# Patient Record
Sex: FEMALE | NOT HISPANIC OR LATINO | Employment: UNEMPLOYED | URBAN - METROPOLITAN AREA
[De-identification: names, ages, dates, MRNs, and addresses within clinical notes are randomized per-mention and may not be internally consistent; named-entity substitution may affect disease eponyms.]

---

## 2018-07-15 ENCOUNTER — HOSPITAL ENCOUNTER (INPATIENT)
Facility: HOSPITAL | Age: 18
LOS: 2 days | DRG: 812 | End: 2018-07-17
Attending: EMERGENCY MEDICINE | Admitting: INTERNAL MEDICINE
Payer: COMMERCIAL

## 2018-07-15 DIAGNOSIS — E87.6 HYPOKALEMIA: ICD-10-CM

## 2018-07-15 DIAGNOSIS — T50.902A INTENTIONAL DRUG OVERDOSE, INITIAL ENCOUNTER (HCC): Primary | ICD-10-CM

## 2018-07-15 DIAGNOSIS — T44.3X1A ANTICHOLINERGIC SYNDROME: ICD-10-CM

## 2018-07-15 PROBLEM — T14.91XA SUICIDE ATTEMPT (HCC): Status: ACTIVE | Noted: 2018-07-15

## 2018-07-15 PROBLEM — T50.901A OVERDOSE: Status: ACTIVE | Noted: 2018-07-15

## 2018-07-15 LAB
ALBUMIN SERPL BCP-MCNC: 4.8 G/DL (ref 3.5–5)
ALP SERPL-CCNC: 56 U/L (ref 46–116)
ALT SERPL W P-5'-P-CCNC: 23 U/L (ref 12–78)
AMPHETAMINES SERPL QL SCN: NEGATIVE
ANION GAP SERPL CALCULATED.3IONS-SCNC: 13 MMOL/L (ref 4–13)
ANION GAP SERPL CALCULATED.3IONS-SCNC: 17 MMOL/L (ref 4–13)
APAP SERPL-MCNC: <2 UG/ML (ref 10–30)
APAP SERPL-MCNC: <2 UG/ML (ref 10–30)
APTT PPP: 32 SECONDS (ref 24–36)
AST SERPL W P-5'-P-CCNC: 15 U/L (ref 5–45)
BACTERIA UR QL AUTO: ABNORMAL /HPF
BARBITURATES UR QL: NEGATIVE
BASOPHILS # BLD AUTO: 0.03 THOUSANDS/ΜL (ref 0–0.1)
BASOPHILS NFR BLD AUTO: 0 % (ref 0–1)
BENZODIAZ UR QL: NEGATIVE
BILIRUB SERPL-MCNC: 0.3 MG/DL (ref 0.2–1)
BILIRUB UR QL STRIP: NEGATIVE
BUN SERPL-MCNC: 12 MG/DL (ref 5–25)
BUN SERPL-MCNC: 16 MG/DL (ref 5–25)
CALCIUM SERPL-MCNC: 10 MG/DL (ref 8.3–10.1)
CALCIUM SERPL-MCNC: 8.7 MG/DL (ref 8.3–10.1)
CHLORIDE SERPL-SCNC: 100 MMOL/L (ref 100–108)
CHLORIDE SERPL-SCNC: 106 MMOL/L (ref 100–108)
CK SERPL-CCNC: 52 U/L (ref 26–192)
CLARITY UR: ABNORMAL
CO2 SERPL-SCNC: 22 MMOL/L (ref 21–32)
CO2 SERPL-SCNC: 22 MMOL/L (ref 21–32)
COCAINE UR QL: NEGATIVE
COLOR UR: YELLOW
COLOR, POC: YELLOW
CREAT SERPL-MCNC: 0.93 MG/DL (ref 0.6–1.3)
CREAT SERPL-MCNC: 1.06 MG/DL (ref 0.6–1.3)
EOSINOPHIL # BLD AUTO: 0.17 THOUSAND/ΜL (ref 0–0.61)
EOSINOPHIL NFR BLD AUTO: 1 % (ref 0–6)
ERYTHROCYTE [DISTWIDTH] IN BLOOD BY AUTOMATED COUNT: 13.1 % (ref 11.6–15.1)
ETHANOL SERPL-MCNC: <3 MG/DL (ref 0–3)
EXT PREG TEST URINE: NEGATIVE
GFR SERPL CREATININE-BSD FRML MDRD: 76 ML/MIN/1.73SQ M
GFR SERPL CREATININE-BSD FRML MDRD: 89 ML/MIN/1.73SQ M
GLUCOSE SERPL-MCNC: 132 MG/DL (ref 65–140)
GLUCOSE SERPL-MCNC: 86 MG/DL (ref 65–140)
GLUCOSE UR STRIP-MCNC: NEGATIVE MG/DL
HCT VFR BLD AUTO: 40.4 % (ref 34.8–46.1)
HGB BLD-MCNC: 13.7 G/DL (ref 11.5–15.4)
HGB UR QL STRIP.AUTO: ABNORMAL
INR PPP: 1.18 (ref 0.86–1.17)
KETONES UR STRIP-MCNC: NEGATIVE MG/DL
LEUKOCYTE ESTERASE UR QL STRIP: ABNORMAL
LYMPHOCYTES # BLD AUTO: 4.26 THOUSANDS/ΜL (ref 0.6–4.47)
LYMPHOCYTES NFR BLD AUTO: 36 % (ref 14–44)
MAGNESIUM SERPL-MCNC: 1.8 MG/DL (ref 1.6–2.6)
MCH RBC QN AUTO: 26.7 PG (ref 26.8–34.3)
MCHC RBC AUTO-ENTMCNC: 33.9 G/DL (ref 31.4–37.4)
MCV RBC AUTO: 79 FL (ref 82–98)
METHADONE UR QL: NEGATIVE
MONOCYTES # BLD AUTO: 0.69 THOUSAND/ΜL (ref 0.17–1.22)
MONOCYTES NFR BLD AUTO: 6 % (ref 4–12)
NEUTROPHILS # BLD AUTO: 6.58 THOUSANDS/ΜL (ref 1.85–7.62)
NEUTS SEG NFR BLD AUTO: 56 % (ref 43–75)
NITRITE UR QL STRIP: NEGATIVE
NON-SQ EPI CELLS URNS QL MICRO: ABNORMAL /HPF
NRBC BLD AUTO-RTO: 0 /100 WBCS
OPIATES UR QL SCN: NEGATIVE
PCP UR QL: NEGATIVE
PH UR STRIP.AUTO: 5.5 [PH] (ref 4.5–8)
PLATELET # BLD AUTO: 267 THOUSANDS/UL (ref 149–390)
PMV BLD AUTO: 11 FL (ref 8.9–12.7)
POTASSIUM SERPL-SCNC: 2.6 MMOL/L (ref 3.5–5.3)
POTASSIUM SERPL-SCNC: 4.3 MMOL/L (ref 3.5–5.3)
PROT SERPL-MCNC: 7.9 G/DL (ref 6.4–8.2)
PROT UR STRIP-MCNC: NEGATIVE MG/DL
PROTHROMBIN TIME: 15.1 SECONDS (ref 11.8–14.2)
RBC # BLD AUTO: 5.13 MILLION/UL (ref 3.81–5.12)
RBC #/AREA URNS AUTO: ABNORMAL /HPF
SALICYLATES SERPL-MCNC: <3 MG/DL (ref 3–20)
SODIUM SERPL-SCNC: 139 MMOL/L (ref 136–145)
SODIUM SERPL-SCNC: 141 MMOL/L (ref 136–145)
SP GR UR STRIP.AUTO: <=1.005 (ref 1–1.03)
THC UR QL: NEGATIVE
TSH SERPL DL<=0.05 MIU/L-ACNC: 2.1 UIU/ML (ref 0.46–3.98)
UROBILINOGEN UR QL STRIP.AUTO: 0.2 E.U./DL
WBC # BLD AUTO: 11.73 THOUSAND/UL (ref 4.31–10.16)
WBC #/AREA URNS AUTO: ABNORMAL /HPF

## 2018-07-15 PROCEDURE — 82550 ASSAY OF CK (CPK): CPT | Performed by: EMERGENCY MEDICINE

## 2018-07-15 PROCEDURE — 80307 DRUG TEST PRSMV CHEM ANLYZR: CPT | Performed by: EMERGENCY MEDICINE

## 2018-07-15 PROCEDURE — 99285 EMERGENCY DEPT VISIT HI MDM: CPT

## 2018-07-15 PROCEDURE — C9113 INJ PANTOPRAZOLE SODIUM, VIA: HCPCS | Performed by: INTERNAL MEDICINE

## 2018-07-15 PROCEDURE — 85730 THROMBOPLASTIN TIME PARTIAL: CPT | Performed by: INTERNAL MEDICINE

## 2018-07-15 PROCEDURE — 85610 PROTHROMBIN TIME: CPT | Performed by: INTERNAL MEDICINE

## 2018-07-15 PROCEDURE — 99223 1ST HOSP IP/OBS HIGH 75: CPT | Performed by: INTERNAL MEDICINE

## 2018-07-15 PROCEDURE — 36415 COLL VENOUS BLD VENIPUNCTURE: CPT | Performed by: EMERGENCY MEDICINE

## 2018-07-15 PROCEDURE — 93005 ELECTROCARDIOGRAM TRACING: CPT

## 2018-07-15 PROCEDURE — 81001 URINALYSIS AUTO W/SCOPE: CPT

## 2018-07-15 PROCEDURE — 80053 COMPREHEN METABOLIC PANEL: CPT | Performed by: EMERGENCY MEDICINE

## 2018-07-15 PROCEDURE — 80048 BASIC METABOLIC PNL TOTAL CA: CPT | Performed by: INTERNAL MEDICINE

## 2018-07-15 PROCEDURE — 83735 ASSAY OF MAGNESIUM: CPT | Performed by: EMERGENCY MEDICINE

## 2018-07-15 PROCEDURE — 81002 URINALYSIS NONAUTO W/O SCOPE: CPT | Performed by: EMERGENCY MEDICINE

## 2018-07-15 PROCEDURE — 85025 COMPLETE CBC W/AUTO DIFF WBC: CPT | Performed by: EMERGENCY MEDICINE

## 2018-07-15 PROCEDURE — 80320 DRUG SCREEN QUANTALCOHOLS: CPT | Performed by: EMERGENCY MEDICINE

## 2018-07-15 PROCEDURE — 80329 ANALGESICS NON-OPIOID 1 OR 2: CPT | Performed by: EMERGENCY MEDICINE

## 2018-07-15 PROCEDURE — 80329 ANALGESICS NON-OPIOID 1 OR 2: CPT | Performed by: INTERNAL MEDICINE

## 2018-07-15 PROCEDURE — 96374 THER/PROPH/DIAG INJ IV PUSH: CPT

## 2018-07-15 PROCEDURE — 84443 ASSAY THYROID STIM HORMONE: CPT | Performed by: EMERGENCY MEDICINE

## 2018-07-15 PROCEDURE — 96361 HYDRATE IV INFUSION ADD-ON: CPT

## 2018-07-15 PROCEDURE — 81025 URINE PREGNANCY TEST: CPT | Performed by: EMERGENCY MEDICINE

## 2018-07-15 RX ORDER — POTASSIUM CHLORIDE 14.9 MG/ML
20 INJECTION INTRAVENOUS
Status: DISCONTINUED | OUTPATIENT
Start: 2018-07-15 | End: 2018-07-15

## 2018-07-15 RX ORDER — ONDANSETRON 2 MG/ML
4 INJECTION INTRAMUSCULAR; INTRAVENOUS EVERY 6 HOURS PRN
Status: DISCONTINUED | OUTPATIENT
Start: 2018-07-15 | End: 2018-07-17

## 2018-07-15 RX ORDER — LORAZEPAM 2 MG/ML
1 INJECTION INTRAMUSCULAR EVERY 4 HOURS PRN
Status: DISCONTINUED | OUTPATIENT
Start: 2018-07-15 | End: 2018-07-16

## 2018-07-15 RX ORDER — ACETAMINOPHEN 325 MG/1
650 TABLET ORAL EVERY 6 HOURS PRN
Status: DISCONTINUED | OUTPATIENT
Start: 2018-07-15 | End: 2018-07-17 | Stop reason: HOSPADM

## 2018-07-15 RX ORDER — LORAZEPAM 2 MG/ML
1 INJECTION INTRAMUSCULAR ONCE
Status: COMPLETED | OUTPATIENT
Start: 2018-07-15 | End: 2018-07-15

## 2018-07-15 RX ORDER — SODIUM CHLORIDE AND POTASSIUM CHLORIDE .9; .15 G/100ML; G/100ML
125 SOLUTION INTRAVENOUS CONTINUOUS
Status: DISCONTINUED | OUTPATIENT
Start: 2018-07-15 | End: 2018-07-15

## 2018-07-15 RX ORDER — SODIUM CHLORIDE 9 MG/ML
125 INJECTION, SOLUTION INTRAVENOUS CONTINUOUS
Status: DISCONTINUED | OUTPATIENT
Start: 2018-07-15 | End: 2018-07-15

## 2018-07-15 RX ORDER — SODIUM CHLORIDE 9 MG/ML
100 INJECTION, SOLUTION INTRAVENOUS CONTINUOUS
Status: DISCONTINUED | OUTPATIENT
Start: 2018-07-15 | End: 2018-07-16

## 2018-07-15 RX ORDER — HALOPERIDOL 5 MG/ML
5 INJECTION INTRAMUSCULAR ONCE
Status: COMPLETED | OUTPATIENT
Start: 2018-07-15 | End: 2018-07-15

## 2018-07-15 RX ORDER — PANTOPRAZOLE SODIUM 40 MG/1
40 INJECTION, POWDER, FOR SOLUTION INTRAVENOUS
Status: DISCONTINUED | OUTPATIENT
Start: 2018-07-15 | End: 2018-07-16

## 2018-07-15 RX ADMIN — SODIUM CHLORIDE 1000 ML: 0.9 INJECTION, SOLUTION INTRAVENOUS at 05:14

## 2018-07-15 RX ADMIN — PANTOPRAZOLE SODIUM 40 MG: 40 INJECTION, POWDER, FOR SOLUTION INTRAVENOUS at 17:10

## 2018-07-15 RX ADMIN — ONDANSETRON 4 MG: 2 INJECTION INTRAMUSCULAR; INTRAVENOUS at 07:15

## 2018-07-15 RX ADMIN — SODIUM CHLORIDE 1000 ML: 0.9 INJECTION, SOLUTION INTRAVENOUS at 03:40

## 2018-07-15 RX ADMIN — LORAZEPAM 1 MG: 2 INJECTION INTRAMUSCULAR; INTRAVENOUS at 03:43

## 2018-07-15 RX ADMIN — LORAZEPAM 1 MG: 2 INJECTION INTRAMUSCULAR; INTRAVENOUS at 19:26

## 2018-07-15 RX ADMIN — SODIUM CHLORIDE AND POTASSIUM CHLORIDE 125 ML/HR: .9; .15 SOLUTION INTRAVENOUS at 08:22

## 2018-07-15 RX ADMIN — SODIUM CHLORIDE 100 ML/HR: 0.9 INJECTION, SOLUTION INTRAVENOUS at 17:10

## 2018-07-15 RX ADMIN — POTASSIUM CHLORIDE 20 MEQ: 200 INJECTION, SOLUTION INTRAVENOUS at 05:14

## 2018-07-15 RX ADMIN — HALOPERIDOL LACTATE 5 MG: 5 INJECTION, SOLUTION INTRAMUSCULAR at 19:34

## 2018-07-15 RX ADMIN — LORAZEPAM 1 MG: 2 INJECTION INTRAMUSCULAR; INTRAVENOUS at 15:21

## 2018-07-15 NOTE — ED NOTES
Pt's belongings gone through  No valuable found  No illicit drugs or empty pill bottles found        Dimas Saldaña RN  07/15/18 2042

## 2018-07-15 NOTE — ED NOTES
Per Dr Agustin Samaniego, ok to take pt to inpt bed  Spoke with ICU, states ok for pt to come to assigned bed  1:1 at bedside in ICU        Kirk Pritchett RN  07/15/18 0795

## 2018-07-15 NOTE — ED PROVIDER NOTES
History  Chief Complaint   Patient presents with    Overdose - Intentional     Patient presents with significant other and friend, report patient took "a percocet" and "a whole bottle of ibuprofen" prior to arrival, friend estimates approximately 1 hour ago  Patient shares "rough past", expressing that she took pills intentionally  Patient presents tonight with her significant other and another friend for an overdose  They state that the patient told them that she took a bottle of ibuprofen and a Percocet about an hour ago  She got into a fight with her significant other which prompted the overdose  It was a suicide attempt  Her friend states that she has done this in the past   She has overdosed on Benadryl and tried to hang herself  They adamantly deny any other ingestion  Patient is awake and alert but disoriented  She cannot give me a proper history  History provided by:  Significant other and friend   used: No    Overdose - Intentional   Ingested substance:  OTC medication  Time since incident:  1 hour  Ingestion amount:  Unknown  Witnesses present: no    Called poison control: no    Incident location:  Another residence  Context: suicide attempt    Associated symptoms: altered mental status and slurred speech    Risk factors: hx of self injury, hx of suicide attempts and similar prior episodes        None       History reviewed  No pertinent past medical history  History reviewed  No pertinent surgical history  History reviewed  No pertinent family history  I have reviewed and agree with the history as documented  Social History   Substance Use Topics    Smoking status: Never Smoker    Smokeless tobacco: Never Used    Alcohol use No        Review of Systems   Unable to perform ROS: Mental status change       Physical Exam  Physical Exam   Constitutional: She appears well-developed and well-nourished  HENT:   Head: Normocephalic and atraumatic     Nose: Nose normal    Mouth/Throat: Mucous membranes are dry  Eyes: Conjunctivae and EOM are normal  Pupils are equal, round, and reactive to light  Right eye exhibits no nystagmus  Left eye exhibits no nystagmus  Neck: Normal range of motion  Neck supple  Cardiovascular: Regular rhythm, normal heart sounds and intact distal pulses  Tachycardia present  Exam reveals no friction rub  No murmur heard  Pulmonary/Chest: Effort normal and breath sounds normal  No stridor  No respiratory distress  She has no wheezes  She has no rales  Abdominal: Soft  She exhibits no distension  There is no tenderness  There is no rebound and no guarding  Musculoskeletal: Normal range of motion  She exhibits no edema, tenderness or deformity  Neurological: She is alert  She is disoriented  Reflex Scores:       Patellar reflexes are 1+ on the right side and 1+ on the left side  2-3 beat clonus   Skin: Skin is warm and dry  Psychiatric: Her affect is blunt  Her speech is slurred  She is slowed and withdrawn  She expresses suicidal ideation  She expresses suicidal plans  Nursing note and vitals reviewed        Vital Signs  ED Triage Vitals   Temperature Pulse Respirations Blood Pressure SpO2   07/15/18 0331 07/15/18 0327 07/15/18 0331 07/15/18 0327 07/15/18 0327   98 4 °F (36 9 °C) (!) 167 18 (!) 181/126 100 %      Temp Source Heart Rate Source Patient Position - Orthostatic VS BP Location FiO2 (%)   07/15/18 0331 07/15/18 0327 07/15/18 0327 07/15/18 0327 --   Oral Monitor Sitting Right arm       Pain Score       --                  Vitals:    07/15/18 0327 07/15/18 0435   BP: (!) 181/126 (!) 179/83   Pulse: (!) 167 (!) 146   Patient Position - Orthostatic VS: Sitting Lying       Visual Acuity      ED Medications  Medications   potassium chloride 20 mEq IVPB (premix) (not administered)   sodium chloride 0 9 % bolus 1,000 mL (not administered)   sodium chloride 0 9 % bolus 1,000 mL (1,000 mL Intravenous New Bag 7/15/18 0340) LORazepam (ATIVAN) 2 mg/mL injection 1 mg (1 mg Intravenous Given 7/15/18 0343)       Diagnostic Studies  Results Reviewed     Procedure Component Value Units Date/Time    Rapid drug screen, urine [[id number]]  (Normal) Collected:  07/15/18 0434    Lab Status:  Final result Specimen:  Urine from Urine, Clean Catch Updated:  07/15/18 0500     Amph/Meth UR Negative     Barbiturate Ur Negative     Benzodiazepine Urine Negative     Cocaine Urine Negative     Methadone Urine Negative     Opiate Urine Negative     PCP Ur Negative     THC Urine Negative    Narrative:         FOR MEDICAL PURPOSES ONLY  IF CONFIRMATION NEEDED PLEASE CONTACT THE LAB WITHIN 5 DAYS      Drug Screen Cutoff Levels:  AMPHETAMINE/METHAMPHETAMINES  1000 ng/mL  BARBITURATES     200 ng/mL  BENZODIAZEPINES     200 ng/mL  COCAINE      300 ng/mL  METHADONE      300 ng/mL  OPIATES      300 ng/mL  PHENCYCLIDINE     25 ng/mL  THC       50 ng/mL    Urine Microscopic [[id number]]  (Abnormal) Collected:  07/15/18 0438    Lab Status:  Final result Specimen:  Urine from Urine, Clean Catch Updated:  07/15/18 0452     RBC, UA None Seen /hpf      WBC, UA 2-4 (A) /hpf      Epithelial Cells Innumerable (A) /hpf      Bacteria, UA Occasional /hpf     POCT urinalysis dipstick [[id number]]  (Abnormal) Resulted:  07/15/18 0433    Lab Status:  Final result Specimen:  Urine Updated:  07/15/18 0433     Color, UA yellow    POCT pregnancy, urine [[id number]]  (Normal) Resulted:  07/15/18 0433    Lab Status:  Final result Updated:  07/15/18 0433     EXT PREG TEST UR (Ref: Negative) negative    ED Urine Macroscopic [[id number]]  (Abnormal) Collected:  07/15/18 0438    Lab Status:  Final result Specimen:  Urine Updated:  07/15/18 0432     Color, UA Yellow     Clarity, UA Cloudy     pH, UA 5 5     Leukocytes, UA Trace (A)     Nitrite, UA Negative     Protein, UA Negative mg/dl      Glucose, UA Negative mg/dl      Ketones, UA Negative mg/dl      Urobilinogen, UA 0 2 E U /dl Bilirubin, UA Negative     Blood, UA Trace (A)     Specific [place], UA <=1 005    Narrative:       CLINITEK RESULT    Acetaminophen level [[id number]]  (Abnormal) Collected:  07/15/18 0340    Lab Status:  Final result Specimen:  Blood from Arm, Right Updated:  07/15/18 0422     Acetaminophen Level <2 (L) ug/mL     Comprehensive metabolic panel [[id number]]  (Abnormal) Collected:  07/15/18 0340    Lab Status:  Final result Specimen:  Blood from Arm, Right Updated:  07/15/18 0416     Sodium 139 mmol/L      Potassium 2 6 (LL) mmol/L      Chloride 100 mmol/L      CO2 22 mmol/L      Anion Gap 17 (H) mmol/L      BUN 16 mg/dL      Creatinine 1 06 mg/dL      Glucose 132 mg/dL      Calcium 10 0 mg/dL      AST 15 U/L      ALT 23 U/L      Alkaline Phosphatase 56 U/L      Total Protein 7 9 g/dL      Albumin 4 8 g/dL      Total Bilirubin 0 30 mg/dL      eGFR 76 ml/min/1 73sq m     Narrative:         National Kidney Disease Education Program recommendations are as follows:  GFR calculation is accurate only with a steady state creatinine  Chronic Kidney disease less than 60 ml/min/1 73 sq  meters  Kidney failure less than 15 ml/min/1 73 sq  meters  Ethanol [[id number]]  (Normal) Collected:  07/15/18 0340    Lab Status:  Final result Specimen:  Blood from Arm, Right Updated:  07/15/18 0415     Ethanol Lvl <3 mg/dL     Magnesium [[id number]]  (Normal) Collected:  07/15/18 0340    Lab Status:  Final result Specimen:  Blood from Arm, Right Updated:  07/15/18 0410     Magnesium 1 8 mg/dL     TSH [[id number]]  (Normal) Collected:  07/15/18 0340    Lab Status:  Final result Specimen:  Blood from Arm, Right Updated:  07/15/18 0410     TSH 3RD GENERATON 2 097 uIU/mL     Narrative:         Patients undergoing fluorescein dye angiography may retain small amounts of fluorescein in the body for 48-72 hours post procedure  Samples containing fluorescein can produce falsely depressed TSH values   If the patient had this procedure,a specimen should be resubmitted post fluorescein clearance            The recommended reference ranges for TSH during pregnancy are as follows:  First trimester 0 1 to 2 5 uIU/mL  Second trimester  0 2 to 3 0 uIU/mL  Third trimester 0 3 to 3 0 uIU/m      CK Total with Reflex CKMB [[id number]]  (Normal) Collected:  07/15/18 0340    Lab Status:  Final result Specimen:  Blood from Arm, Right Updated:  07/15/18 0410     Total CK 52 U/L     Salicylate level [[id number]]  (Abnormal) Collected:  07/15/18 0340    Lab Status:  Final result Specimen:  Blood from Arm, Right Updated:  62/88/38 8794     Salicylate Lvl <3 (L) mg/dL     CBC and differential [[id number]]  (Abnormal) Collected:  07/15/18 0340    Lab Status:  Final result Specimen:  Blood from Arm, Right Updated:  07/15/18 0345     WBC 11 73 (H) Thousand/uL      RBC 5 13 (H) Million/uL      Hemoglobin 13 7 g/dL      Hematocrit 40 4 %      MCV 79 (L) fL      MCH 26 7 (L) pg      MCHC 33 9 g/dL      RDW 13 1 %      MPV 11 0 fL      Platelets 722 Thousands/uL      nRBC 0 /100 WBCs      Neutrophils Relative 56 %      Lymphocytes Relative 36 %      Monocytes Relative 6 %      Eosinophils Relative 1 %      Basophils Relative 0 %      Neutrophils Absolute 6 58 Thousands/µL      Lymphocytes Absolute 4 26 Thousands/µL      Monocytes Absolute 0 69 Thousand/µL      Eosinophils Absolute 0 17 Thousand/µL      Basophils Absolute 0 03 Thousands/µL                  No orders to display              Procedures  ECG 12 Lead Documentation  Date/Time: 7/15/2018 3:52 AM  Performed by: Danae Finn  Authorized by: Danae Finn     Indications / Diagnosis:  Overdose  Patient location:  ED  Previous ECG:     Previous ECG:  Unavailable  Interpretation:     Interpretation: normal    Quality:     Tracing quality:  Limited by artifact  Rate:     ECG rate:  161    ECG rate assessment: tachycardic    Rhythm:     Rhythm: sinus tachycardia    Ectopy:     Ectopy: none    QRS:     QRS axis:  Right    QRS intervals:  Normal  Conduction:     Conduction: normal    ST segments:     ST segments:  Non-specific  T waves:     T waves: non-specific      CriticalCare Time  Performed by: Aby Marin  Authorized by: Aby Marin     Critical care provider statement:     Critical care time (minutes):  45    Critical care time was exclusive of:  Separately billable procedures and treating other patients and teaching time    Critical care was necessary to treat or prevent imminent or life-threatening deterioration of the following conditions:  Toxidrome    Critical care was time spent personally by me on the following activities:  Blood draw for specimens, obtaining history from patient or surrogate, development of treatment plan with patient or surrogate, discussions with consultants, evaluation of patient's response to treatment, examination of patient, interpretation of cardiac output measurements, ordering and performing treatments and interventions, ordering and review of laboratory studies, ordering and review of radiographic studies, review of old charts and re-evaluation of patient's condition    I assumed direction of critical care for this patient from another provider in my specialty: no             Phone Contacts  ED Phone Contact    ED Course                               MDM  Number of Diagnoses or Management Options  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  Hypokalemia: new and requires workup  Intentional drug overdose, initial encounter [facility]): new and requires workup  Diagnosis management comments: 3:48 AM  Patient presents for an intentional overdose  Symptoms right now  Seems to be consistent with an anticholinergic syndrome  Will start with Ativan, labs, IV fluids and reassess  4:32 AM  Labs noted  Will replace her potassium  Will continue with supportive care  4:40 AM  Pt now hallucinating  She appears to be picking at the air  Vital signs are starting to improve  This still appears to be an anticholinergic syndrome  Will continue with supportive care  Patient will be admitted to critical care in the step-down unit  Amount and/or Complexity of Data Reviewed  Clinical lab tests: reviewed and ordered  Tests in the medicine section of CPT®: ordered and reviewed  Discuss the patient with other providers: yes    Patient Progress  Patient progress: stable    CritCare Time    Disposition  Final diagnoses:   Intentional drug overdose, initial encounter ([place] [address] )   Anticholinergic syndrome   Hypokalemia     Time reflects when diagnosis was documented in both MDM as applicable and the Disposition within this note     Time User Action Codes Description Comment    7/15/2018  4:43 AM Smeriglio, Tennie Bridegroom Add [T50 902A] Intentional drug overdose, initial encounter ([place] [address] )     7/15/2018  4:43 AM Smeriglio, Tennie Bridegroom Add [T44 3X2A] Poisoning by parasympatholytic drug, intentional self-harm, initial encounter ([place] [address] )     7/15/2018  4:44 AM Smeriglio, Tennie Bridegroom Add [T44 3X1A] Anticholinergic syndrome     7/15/2018  4:44 AM Smeriglio, Mihir Patton [T44 3X2A] Poisoning by parasympatholytic drug, intentional self-harm, initial encounter ([place] [address] )     7/15/2018  4:45 AM Smeriglio, Tennie Bridegroom Add [E87 6] Hypokalemia       ED Disposition     ED Disposition Condition Comment    Admit  Case was discussed with Critical Care and the patient's admission status was agreed to be Admission Status: inpatient status to the service of Dr Ericka Booth   Follow-up Information    None         Patient's Medications    No medications on file     No discharge procedures on file      ED Provider  Electronically Signed by           Sarath Weaver DO  07/15/18 7237

## 2018-07-15 NOTE — ED NOTES
Pt  Vomited a large amount at this time it was witnessed by staff   Pt  Was turned and suctioned      Pan Herrera RN  07/15/18 9265

## 2018-07-15 NOTE — H&P
History and Physical - [facility]    Patient Information: Kosta Jenkins 19 y o  female MRN: [id number]  Unit/Bed#: ED 16 Encounter: [id number]  Admitting Physician: Gretel Locke PA-C  PCP: No primary care provider on file  Date of Admission:  07/15/18    Assessment/Plan:    Hospital Problem List:     Principal Problem:    Overdose  Active Problems:    Suicide attempt ([address] )    Hypokalemia      Plan for the Primary Problem(s):  · Intentional overdose   · Admit to stepdown on telemetry  Patient told family that she took 2 bottles of advil but she is presenting more like benadryl overdose  Sedated after being given ativan in ED  Family at bedside  They state this is her second suicide attempt in last 6 months  No current diagnosis of depression  Will consult psychiatry  On continual observation  Plan for Additional Problems:   · Hypokalemia- replace potassium  Recheck labs tomorrow    VTE Prophylaxis: Pharmacologic VTE Prophylaxis contraindicated due to low risk  / sequential compression device   Code Status: full code  POLST: There is no POLST form on file for this patient (pre-hospital)    Anticipated Length of Stay:  Patient will be admitted on an Inpatient basis with an anticipated length of stay of  Greater than 2 midnights  Justification for Hospital Stay: patient requires telemetry monitoring and psych consult    Total Time for Visit, including Counseling / Coordination of Care: 45 minutes  Greater than 50% of this total time spent on direct patient counseling and coordination of care  Chief Complaint:   overdose    History of Present Illness:    Kosta Jenkins is a 19 y o  female who presents with overdose  Friend/boyfriend at bedside  They state she had been fighting with the boyfriend and wanted to move out  She called someone to pick her up but they didn't go to get her  At 2am she called again to thank friend for everything and she told them she took 2 bottles of advil  She asked to go to the hospital  Friends tried to get her to make herself throw up but she was not able to  They state she had tried to hang herself within the past 6 months but they did not seek medical care for her because they didn't think it was a severe enough attempt  She does not have a history of depression and takes no medications  She does not see psych and has no previous psych admissions  Friends admit that she regularly takes percocet which she purchases off the street  No alcohol use or other drug use that they know of  Review of Systems:    Review of Systems   Unable to perform ROS: Mental status change       Past Medical and Surgical History:     History reviewed  No pertinent past medical history  History reviewed  No pertinent surgical history  Meds/Allergies:    Prior to Admission medications    Not on File     I have reviewed home medications with patient family member  Allergies: No Known Allergies    Social History:     Marital Status: Single   Occupation: unemployed  Patient Pre-hospital Living Situation: lives with boyfriend  Patient Pre-hospital Level of Mobility: ambulatory  Patient Pre-hospital Diet Restrictions: none  Substance Use History:   History   Alcohol Use No     History   Smoking Status    Never Smoker   Smokeless Tobacco    Never Used     History   Drug Use    Types: Prescription, Marijuana     Comment: Percocet       Family History:    non-contributory    Physical Exam:     Vitals:   Blood Pressure: 156/80 (07/15/18 0516)  Pulse: (!) 142 (07/15/18 0516)  Temperature: 98 4 °F (36 9 °C) (07/15/18 0331)  Temp Source: Oral (07/15/18 0331)  Respirations: 20 (07/15/18 0516)  Weight - Scale: 59 kg (130 lb) (07/15/18 0331)  SpO2: 100 % (07/15/18 0516)    Physical Exam   Constitutional: She appears well-developed and well-nourished  HENT:   Head: Normocephalic and atraumatic  Eyes: Conjunctivae are normal  Pupils are equal, round, and reactive to light     Neck: Normal range of motion  Neck supple  No tracheal deviation present  No thyromegaly present  Cardiovascular:   tachy   Pulmonary/Chest: Effort normal and breath sounds normal  No respiratory distress  She has no wheezes  Abdominal: Soft  Bowel sounds are normal  She exhibits no distension  There is no tenderness  Musculoskeletal: Normal range of motion  She exhibits no edema, tenderness or deformity  Neurological: She is alert  Disoriented and agitated    Skin: Skin is warm  She is diaphoretic  Vitals reviewed  Additional Data:     Lab Results: I have personally reviewed pertinent reports  Results from last 7 days  Lab Units 07/15/18  0340   WBC Thousand/uL 11 73*   HEMOGLOBIN g/dL 13 7   HEMATOCRIT % 40 4   PLATELETS Thousands/uL 267   NEUTROS PCT % 56   LYMPHS PCT % 36   MONOS PCT % 6   EOS PCT % 1       Results from last 7 days  Lab Units 07/15/18  0340   SODIUM mmol/L 139   POTASSIUM mmol/L 2 6*   CHLORIDE mmol/L 100   CO2 mmol/L 22   BUN mg/dL 16   CREATININE mg/dL 1 06   CALCIUM mg/dL 10 0   TOTAL PROTEIN g/dL 7 9   BILIRUBIN TOTAL mg/dL 0 30   ALK PHOS U/L 56   ALT U/L 23   AST U/L 15   GLUCOSE RANDOM mg/dL 132           Imaging: I have personally reviewed pertinent reports  No results found  EKG, Pathology, and Other Studies Reviewed on Admission:   · EKG: tachy    Allscripts / Epic Records Reviewed: Yes     ** Please Note: This note has been constructed using a voice recognition system   **

## 2018-07-15 NOTE — PROGRESS NOTES
Anna't Boyfriend Caity King called in reporting that he found two bottles of Motrin PM (200mg Ibuprophen, 38mg Diphenhydramine)  Of the two bottles, only half of one remained, suggesting that the patient ingested one and a half bottles  Discussed with Dr Abdoulaye Gannon

## 2018-07-16 ENCOUNTER — APPOINTMENT (INPATIENT)
Dept: CT IMAGING | Facility: HOSPITAL | Age: 18
DRG: 812 | End: 2018-07-16
Payer: COMMERCIAL

## 2018-07-16 LAB
ALBUMIN SERPL BCP-MCNC: 4.3 G/DL (ref 3.5–5)
ALP SERPL-CCNC: 53 U/L (ref 46–116)
ALT SERPL W P-5'-P-CCNC: 34 U/L (ref 12–78)
AMMONIA PLAS-SCNC: <10 UMOL/L (ref 11–35)
AMPHETAMINES SERPL QL SCN: NEGATIVE
ANION GAP SERPL CALCULATED.3IONS-SCNC: 14 MMOL/L (ref 4–13)
AST SERPL W P-5'-P-CCNC: 39 U/L (ref 5–45)
ATRIAL RATE: 161 BPM
BARBITURATES UR QL: NEGATIVE
BENZODIAZ UR QL: NEGATIVE
BILIRUB SERPL-MCNC: 0.35 MG/DL (ref 0.2–1)
BUN SERPL-MCNC: 12 MG/DL (ref 5–25)
CALCIUM SERPL-MCNC: 8.6 MG/DL (ref 8.3–10.1)
CHLORIDE SERPL-SCNC: 109 MMOL/L (ref 100–108)
CO2 SERPL-SCNC: 20 MMOL/L (ref 21–32)
COCAINE UR QL: NEGATIVE
CREAT SERPL-MCNC: 1.13 MG/DL (ref 0.6–1.3)
ERYTHROCYTE [DISTWIDTH] IN BLOOD BY AUTOMATED COUNT: 13.8 % (ref 11.6–15.1)
GFR SERPL CREATININE-BSD FRML MDRD: 70 ML/MIN/1.73SQ M
GLUCOSE SERPL-MCNC: 148 MG/DL (ref 65–140)
GLUCOSE SERPL-MCNC: 55 MG/DL (ref 65–140)
HCT VFR BLD AUTO: 38.2 % (ref 34.8–46.1)
HGB BLD-MCNC: 12.6 G/DL (ref 11.5–15.4)
MCH RBC QN AUTO: 26.1 PG (ref 26.8–34.3)
MCHC RBC AUTO-ENTMCNC: 33 G/DL (ref 31.4–37.4)
MCV RBC AUTO: 79 FL (ref 82–98)
METHADONE UR QL: NEGATIVE
OPIATES UR QL SCN: NEGATIVE
P AXIS: 55 DEGREES
PCP UR QL: NEGATIVE
PLATELET # BLD AUTO: 194 THOUSANDS/UL (ref 149–390)
PMV BLD AUTO: 10.5 FL (ref 8.9–12.7)
POTASSIUM SERPL-SCNC: 3.8 MMOL/L (ref 3.5–5.3)
PR INTERVAL: 130 MS
PROT SERPL-MCNC: 7.2 G/DL (ref 6.4–8.2)
QRS AXIS: 91 DEGREES
QRSD INTERVAL: 80 MS
QT INTERVAL: 256 MS
QTC INTERVAL: 418 MS
RBC # BLD AUTO: 4.82 MILLION/UL (ref 3.81–5.12)
SODIUM SERPL-SCNC: 143 MMOL/L (ref 136–145)
T WAVE AXIS: 38 DEGREES
THC UR QL: NEGATIVE
VENTRICULAR RATE: 161 BPM
WBC # BLD AUTO: 7.65 THOUSAND/UL (ref 4.31–10.16)

## 2018-07-16 PROCEDURE — 99253 IP/OBS CNSLTJ NEW/EST LOW 45: CPT | Performed by: PSYCHIATRY & NEUROLOGY

## 2018-07-16 PROCEDURE — 80307 DRUG TEST PRSMV CHEM ANLYZR: CPT | Performed by: INTERNAL MEDICINE

## 2018-07-16 PROCEDURE — 93010 ELECTROCARDIOGRAM REPORT: CPT | Performed by: INTERNAL MEDICINE

## 2018-07-16 PROCEDURE — 80053 COMPREHEN METABOLIC PANEL: CPT | Performed by: INTERNAL MEDICINE

## 2018-07-16 PROCEDURE — C9113 INJ PANTOPRAZOLE SODIUM, VIA: HCPCS | Performed by: INTERNAL MEDICINE

## 2018-07-16 PROCEDURE — 85027 COMPLETE CBC AUTOMATED: CPT | Performed by: INTERNAL MEDICINE

## 2018-07-16 PROCEDURE — 70450 CT HEAD/BRAIN W/O DYE: CPT

## 2018-07-16 PROCEDURE — 82948 REAGENT STRIP/BLOOD GLUCOSE: CPT

## 2018-07-16 PROCEDURE — 99232 SBSQ HOSP IP/OBS MODERATE 35: CPT | Performed by: INTERNAL MEDICINE

## 2018-07-16 PROCEDURE — 82140 ASSAY OF AMMONIA: CPT | Performed by: INTERNAL MEDICINE

## 2018-07-16 RX ORDER — LORAZEPAM 0.5 MG/1
0.5 TABLET ORAL EVERY 8 HOURS PRN
Status: DISCONTINUED | OUTPATIENT
Start: 2018-07-16 | End: 2018-07-17 | Stop reason: HOSPADM

## 2018-07-16 RX ORDER — DEXTROSE MONOHYDRATE 25 G/50ML
50 INJECTION, SOLUTION INTRAVENOUS ONCE
Status: COMPLETED | OUTPATIENT
Start: 2018-07-16 | End: 2018-07-16

## 2018-07-16 RX ORDER — SODIUM CHLORIDE AND POTASSIUM CHLORIDE .9; .15 G/100ML; G/100ML
100 SOLUTION INTRAVENOUS CONTINUOUS
Status: DISCONTINUED | OUTPATIENT
Start: 2018-07-16 | End: 2018-07-17

## 2018-07-16 RX ORDER — FAMOTIDINE 20 MG/1
20 TABLET, FILM COATED ORAL 2 TIMES DAILY
Status: DISCONTINUED | OUTPATIENT
Start: 2018-07-16 | End: 2018-07-17 | Stop reason: HOSPADM

## 2018-07-16 RX ORDER — DEXTROSE MONOHYDRATE 25 G/50ML
INJECTION, SOLUTION INTRAVENOUS
Status: COMPLETED
Start: 2018-07-16 | End: 2018-07-16

## 2018-07-16 RX ADMIN — PANTOPRAZOLE SODIUM 40 MG: 40 INJECTION, POWDER, FOR SOLUTION INTRAVENOUS at 09:17

## 2018-07-16 RX ADMIN — SODIUM CHLORIDE 100 ML/HR: 0.9 INJECTION, SOLUTION INTRAVENOUS at 03:41

## 2018-07-16 RX ADMIN — SODIUM CHLORIDE AND POTASSIUM CHLORIDE 100 ML/HR: .9; .15 SOLUTION INTRAVENOUS at 13:34

## 2018-07-16 RX ADMIN — LORAZEPAM 1 MG: 2 INJECTION INTRAMUSCULAR; INTRAVENOUS at 05:08

## 2018-07-16 RX ADMIN — DEXTROSE MONOHYDRATE 50 ML: 25 INJECTION, SOLUTION INTRAVENOUS at 06:24

## 2018-07-16 NOTE — PROGRESS NOTES
Progress Note - Kosta Jenkins 19 y o  female MRN: [id number]    Unit/Bed#: ICU 09 Encounter: [id number]    Assessment/Plan:    Intentional overdose  presumed Benadryl and ibuprofen, continue IV fluids and Pepcid    Suicide attempt  patient admits to intentional overdose, related to depression and wanting to die, continue one-to-one observation and await psychiatry consult    Major depressive disorder await psychiatry consult    Anxiety    continue p r n  Ativan    Hypokalemia   corrected    Subjective:   Feels much better, still depressed but denies chest pain shortness of breath nausea vomiting diarrhea no fevers chills poor appetite anxious    Objective:     Vitals: Blood pressure 128/63, pulse 90, temperature (!) 101 2 °F (38 4 °C), temperature source Temporal, resp  rate (!) 25, weight 52 6 kg (115 lb 15 4 oz), last menstrual period 07/01/2018, SpO2 100 %  ,There is no height or weight on file to calculate BMI          Results from last 7 days  Lab Units 07/16/18  0451 07/15/18  1039   WBC Thousand/uL 7 65  --    HEMOGLOBIN g/dL 12 6  --    HEMATOCRIT % 38 2  --    PLATELETS Thousands/uL 194  --    INR   --  1 18*       Results from last 7 days  Lab Units 07/16/18  0451   SODIUM mmol/L 143   POTASSIUM mmol/L 3 8   CHLORIDE mmol/L 109*   CO2 mmol/L 20*   BUN mg/dL 12   CREATININE mg/dL 1 13   CALCIUM mg/dL 8 6   TOTAL PROTEIN g/dL 7 2   BILIRUBIN TOTAL mg/dL 0 35   ALK PHOS U/L 53   ALT U/L 34   AST U/L 39   GLUCOSE RANDOM mg/dL 55*       Scheduled Meds:    Current Facility-Administered Medications:  acetaminophen 650 mg Oral Q6H PRN Gretel Locke PA-C    famotidine 20 mg Oral BID [facility], DO    LORazepam 0 5 mg Oral Q8H PRN [facility], DO    ondansetron 4 mg Intravenous Q6H PRN Gretel Locke PA-C    sodium chloride 100 mL/hr Intravenous Continuous Jennifer Murphy MD Last Rate: 100 mL/hr (07/16/18 0601)       Continuous Infusions:    sodium chloride 100 mL/hr Last Rate: 100 mL/hr (07/16/18 0601) Physical exam:  General appearance:  Alert no distress interaction appropriate oriented x3  Head/Eyes:  Nonicteric PERRL EOMI  Neck:  Supple  Lungs: CTA bilateral no wheezing rhonchi or rales  Heart: normal S1 S2 regular  Abdomen: Soft nontender with bowel sounds  Extremities: no edema  Skin: no rash    Invasive Devices     Peripheral Intravenous Line            Peripheral IV 07/15/18 Left Antecubital 1 day    Peripheral IV 07/15/18 Right Antecubital 1 day          Drain            Urethral Catheter 16 Fr  1 day                  VTE Pharmacologic Prophylaxis:  SCDs   VTE Mechanical Prophylaxis:  SCDs                     Counseling / Coordination of Care  Total floor / unit time spent today 30   minutes  Greater than 50% of total time was spent with the patient and / or family counseling and / or coordination of care    A description of the counseling / coordination of care:  Discussed with mother

## 2018-07-16 NOTE — CONSULTS
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  PLAN:   1) Patient has signed a 201  2)Continue 1:1 observation  Pan Godinez 3) Transfer patient to inpatient psych once medically stable and appropriate bed is available  4) Communicated with patients' RN  Chief Complaint: "I have depression  "    History of Present Illness:  THis is a 21 CF who was admitted after she presented with OD of 2 bottles of Ibuprofen  Patient said that she has too many problems in her life  No one loves her and she wanted to kill herself as she felt no one wants her  She  denied any acute incidenet that led to that  Her mother was also present at the time of interview and shared that patient was not on talking terms with her mother for last 1 year and that is a stressing factor but mother feels that patient had an argument with her boyfriend  Patient denied that  Patient said that her mother is not letting her meet her younger sister  She said she she feels hopeless and worthless  He said she has depression and was not sleeping well  She was thinking about suicide for a while  She agreed on signing for a 201  PAST PSYCH HISTORY:    Patient was never treated for any psych problems but said that she has attempted suicide many times by OD on meds but never got help for it  Substance Abuse History:    Possible pain medication abuse  Family Psychiatric History:   He reports that his mother has depression    Social History:  Social History     Social History    Marital status: Single     Spouse name: N/A    Number of children: N/A    Years of education: N/A     Occupational History    Not on file       Social History Main Topics    Smoking status: Never Smoker    Smokeless tobacco: Never Used    Alcohol use No    Drug use: Yes     Types: Prescription, Marijuana      Comment: Percocet    Sexual activity: Not on file     Other Topics Concern    Not on file     Social History Narrative    No narrative on file     Traumatic History:   Abuse: None  Other Traumatic Events: None  History reviewed  No pertinent past medical history  Medical Review Of Systems:  All 12 point review of system is normal except for what is mention in medical hisotry  Scheduled Meds:  Current Facility-Administered Medications:  acetaminophen 650 mg Oral Q6H PRN Leo Garcia PA-C    famotidine 20 mg Oral BID Pat Chew, DO    LORazepam 0 5 mg Oral Q8H PRN Pat Chew, DO    ondansetron 4 mg Intravenous Q6H PRN Leo Garcia PA-C    sodium chloride 0 9 % with KCl 20 mEq/L 100 mL/hr Intravenous Continuous Pat Chew, DO Last Rate: Stopped (07/17/18 0900)     Continuous Infusions:  sodium chloride 0 9 % with KCl 20 mEq/L 100 mL/hr Last Rate: Stopped (07/17/18 0900)     PRN Meds:   acetaminophen    LORazepam    ondansetron     No Known Allergies     Vitals:    07/16/18 2339 07/17/18 0300 07/17/18 0600 07/17/18 0700   BP: 105/70  120/64    BP Location: Left arm  Right arm    Pulse: 66  86    Resp: 18  18    Temp:  98 7 °F (37 1 °C)  98 6 °F (37 °C)   TempSrc:  Temporal  Temporal   SpO2: 99%  99%    Weight:       Height: 5' 4" (1 626 m)                Mental Status Evaluation:  Appearance:  Of age, anxious, distaurt  Behavior:  Cooperative  Speech:  Slurred but goal directed  Mood:  Depressed   Affect Anxious and tearfull  Language: naming objects and Goal directed  Thought Process:   logical and linear    Thought Content:  Hopelessness and worthlessness  Perceptual Disturbances:  denying any auditory and visual hallucinations  Risk Potential: Suicidal ideas with attempt  Sensorium:  person, place, time/date, situation, day of week, month of year and year   Cognition:  grossly intact   Consciousness:   alert, awake, and oriented ×3    Attention: Poor     Intellect: Normal   Fund of Knowledge: awareness of current events: normal    Insight:  Poor  Judgment: Impaired  Muscle Strength and Tone: Normal    Gait/Station:  gait was not assessed    Motor Activity: no abnormal movements     Lab Results: I have personally reviewed pertinent lab results  NOTE:  Total of 40 minutes were spent in talking to patient completing this medical record reviewing medical chart medical decision making    Ninfa Jacinto MD
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
## 2018-07-17 ENCOUNTER — HOSPITAL ENCOUNTER (INPATIENT)
Facility: HOSPITAL | Age: 18
LOS: 7 days | Discharge: HOME | DRG: 751 | End: 2018-07-24
Attending: PSYCHIATRY & NEUROLOGY | Admitting: PSYCHIATRY & NEUROLOGY
Payer: COMMERCIAL

## 2018-07-17 VITALS
HEIGHT: 64 IN | RESPIRATION RATE: 18 BRPM | WEIGHT: 115.96 LBS | OXYGEN SATURATION: 99 % | TEMPERATURE: 99.8 F | HEART RATE: 86 BPM | DIASTOLIC BLOOD PRESSURE: 64 MMHG | BODY MASS INDEX: 19.8 KG/M2 | SYSTOLIC BLOOD PRESSURE: 120 MMHG

## 2018-07-17 DIAGNOSIS — G47.00 INSOMNIA: ICD-10-CM

## 2018-07-17 DIAGNOSIS — F33.2 SEVERE EPISODE OF RECURRENT MAJOR DEPRESSIVE DISORDER, WITHOUT PSYCHOTIC FEATURES (HCC): Primary | ICD-10-CM

## 2018-07-17 DIAGNOSIS — T50.902A INTENTIONAL DRUG OVERDOSE, INITIAL ENCOUNTER (HCC): ICD-10-CM

## 2018-07-17 LAB
ANION GAP SERPL CALCULATED.3IONS-SCNC: 9 MMOL/L (ref 4–13)
BUN SERPL-MCNC: 10 MG/DL (ref 5–25)
CALCIUM SERPL-MCNC: 8.5 MG/DL (ref 8.3–10.1)
CHLORIDE SERPL-SCNC: 110 MMOL/L (ref 100–108)
CO2 SERPL-SCNC: 22 MMOL/L (ref 21–32)
CREAT SERPL-MCNC: 0.82 MG/DL (ref 0.6–1.3)
GFR SERPL CREATININE-BSD FRML MDRD: 103 ML/MIN/1.73SQ M
GLUCOSE SERPL-MCNC: 88 MG/DL (ref 65–140)
POTASSIUM SERPL-SCNC: 4.1 MMOL/L (ref 3.5–5.3)
SODIUM SERPL-SCNC: 141 MMOL/L (ref 136–145)

## 2018-07-17 PROCEDURE — 99232 SBSQ HOSP IP/OBS MODERATE 35: CPT | Performed by: INTERNAL MEDICINE

## 2018-07-17 PROCEDURE — 80048 BASIC METABOLIC PNL TOTAL CA: CPT | Performed by: INTERNAL MEDICINE

## 2018-07-17 RX ORDER — FAMOTIDINE 20 MG/1
20 TABLET, FILM COATED ORAL 2 TIMES DAILY
Status: DISCONTINUED | OUTPATIENT
Start: 2018-07-18 | End: 2018-07-18

## 2018-07-17 RX ORDER — HALOPERIDOL 5 MG/ML
5 INJECTION INTRAMUSCULAR EVERY 6 HOURS PRN
Status: CANCELLED | OUTPATIENT
Start: 2018-07-17

## 2018-07-17 RX ORDER — LORAZEPAM 1 MG/1
1 TABLET ORAL EVERY 6 HOURS PRN
Status: CANCELLED | OUTPATIENT
Start: 2018-07-17

## 2018-07-17 RX ORDER — LORAZEPAM 2 MG/ML
2 INJECTION INTRAMUSCULAR EVERY 6 HOURS PRN
Status: DISCONTINUED | OUTPATIENT
Start: 2018-07-17 | End: 2018-07-24 | Stop reason: HOSPADM

## 2018-07-17 RX ORDER — MAGNESIUM HYDROXIDE/ALUMINUM HYDROXICE/SIMETHICONE 120; 1200; 1200 MG/30ML; MG/30ML; MG/30ML
30 SUSPENSION ORAL EVERY 4 HOURS PRN
Status: CANCELLED | OUTPATIENT
Start: 2018-07-17

## 2018-07-17 RX ORDER — BENZTROPINE MESYLATE 1 MG/1
1 TABLET ORAL EVERY 6 HOURS PRN
Status: CANCELLED | OUTPATIENT
Start: 2018-07-17

## 2018-07-17 RX ORDER — TRAZODONE HYDROCHLORIDE 50 MG/1
50 TABLET ORAL
Status: DISCONTINUED | OUTPATIENT
Start: 2018-07-17 | End: 2018-07-24 | Stop reason: HOSPADM

## 2018-07-17 RX ORDER — LORAZEPAM 1 MG/1
1 TABLET ORAL EVERY 6 HOURS PRN
Status: DISCONTINUED | OUTPATIENT
Start: 2018-07-17 | End: 2018-07-24 | Stop reason: HOSPADM

## 2018-07-17 RX ORDER — BENZTROPINE MESYLATE 1 MG/1
1 TABLET ORAL EVERY 6 HOURS PRN
Status: DISCONTINUED | OUTPATIENT
Start: 2018-07-17 | End: 2018-07-24 | Stop reason: HOSPADM

## 2018-07-17 RX ORDER — RISPERIDONE 1 MG/1
1 TABLET, ORALLY DISINTEGRATING ORAL
Status: CANCELLED | OUTPATIENT
Start: 2018-07-17

## 2018-07-17 RX ORDER — MAGNESIUM HYDROXIDE/ALUMINUM HYDROXICE/SIMETHICONE 120; 1200; 1200 MG/30ML; MG/30ML; MG/30ML
30 SUSPENSION ORAL EVERY 4 HOURS PRN
Status: DISCONTINUED | OUTPATIENT
Start: 2018-07-17 | End: 2018-07-24 | Stop reason: HOSPADM

## 2018-07-17 RX ORDER — FAMOTIDINE 20 MG/1
20 TABLET, FILM COATED ORAL 2 TIMES DAILY
Status: CANCELLED | OUTPATIENT
Start: 2018-07-17

## 2018-07-17 RX ORDER — HALOPERIDOL 5 MG/ML
5 INJECTION INTRAMUSCULAR EVERY 6 HOURS PRN
Status: DISCONTINUED | OUTPATIENT
Start: 2018-07-17 | End: 2018-07-24 | Stop reason: HOSPADM

## 2018-07-17 RX ORDER — BENZTROPINE MESYLATE 1 MG/ML
1 INJECTION INTRAMUSCULAR; INTRAVENOUS EVERY 6 HOURS PRN
Status: CANCELLED | OUTPATIENT
Start: 2018-07-17

## 2018-07-17 RX ORDER — OLANZAPINE 10 MG/1
10 INJECTION, POWDER, LYOPHILIZED, FOR SOLUTION INTRAMUSCULAR
Status: DISCONTINUED | OUTPATIENT
Start: 2018-07-17 | End: 2018-07-24 | Stop reason: HOSPADM

## 2018-07-17 RX ORDER — ACETAMINOPHEN 325 MG/1
650 TABLET ORAL EVERY 6 HOURS PRN
Status: CANCELLED | OUTPATIENT
Start: 2018-07-17

## 2018-07-17 RX ORDER — HALOPERIDOL 5 MG
5 TABLET ORAL EVERY 6 HOURS PRN
Status: DISCONTINUED | OUTPATIENT
Start: 2018-07-17 | End: 2018-07-24 | Stop reason: HOSPADM

## 2018-07-17 RX ORDER — LORAZEPAM 2 MG/ML
2 INJECTION INTRAMUSCULAR EVERY 6 HOURS PRN
Status: CANCELLED | OUTPATIENT
Start: 2018-07-17

## 2018-07-17 RX ORDER — RISPERIDONE 1 MG/1
1 TABLET, ORALLY DISINTEGRATING ORAL
Status: DISCONTINUED | OUTPATIENT
Start: 2018-07-17 | End: 2018-07-24 | Stop reason: HOSPADM

## 2018-07-17 RX ORDER — BENZTROPINE MESYLATE 1 MG/ML
1 INJECTION INTRAMUSCULAR; INTRAVENOUS EVERY 6 HOURS PRN
Status: DISCONTINUED | OUTPATIENT
Start: 2018-07-17 | End: 2018-07-24 | Stop reason: HOSPADM

## 2018-07-17 RX ORDER — TRAZODONE HYDROCHLORIDE 50 MG/1
50 TABLET ORAL
Status: CANCELLED | OUTPATIENT
Start: 2018-07-17

## 2018-07-17 RX ORDER — OLANZAPINE 10 MG/1
10 INJECTION, POWDER, LYOPHILIZED, FOR SOLUTION INTRAMUSCULAR
Status: CANCELLED | OUTPATIENT
Start: 2018-07-17

## 2018-07-17 RX ORDER — HALOPERIDOL 5 MG
5 TABLET ORAL EVERY 6 HOURS PRN
Status: CANCELLED | OUTPATIENT
Start: 2018-07-17

## 2018-07-17 RX ORDER — ACETAMINOPHEN 325 MG/1
650 TABLET ORAL EVERY 6 HOURS PRN
Status: DISCONTINUED | OUTPATIENT
Start: 2018-07-17 | End: 2018-07-24 | Stop reason: HOSPADM

## 2018-07-17 RX ORDER — OLANZAPINE 5 MG/1
10 TABLET ORAL
Status: CANCELLED | OUTPATIENT
Start: 2018-07-17

## 2018-07-17 RX ORDER — OLANZAPINE 10 MG/1
10 TABLET ORAL
Status: DISCONTINUED | OUTPATIENT
Start: 2018-07-17 | End: 2018-07-24 | Stop reason: HOSPADM

## 2018-07-17 RX ADMIN — SODIUM CHLORIDE AND POTASSIUM CHLORIDE 100 ML/HR: .9; .15 SOLUTION INTRAVENOUS at 00:53
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
## 2018-07-17 NOTE — SOCIAL WORK
CM met with pt at bedside; pt was a calm and cooperative  Pt reports she lives with her boyfriend  She denied any current suicidal ideation but she does feel depressed; she denied hallucinations, she has no OP psych services  She has no PCP  No insurance  ZACKARY looking for an in pt psych bed at \[facility]"" or [facility]  Explained the process to pt  CM following

## 2018-07-17 NOTE — SOCIAL WORK
Pt is accepted at [address] by Dr Jose Luis Lopez  CM arranged [facility] BLS today at 4:30 PM   Pt requested CM call her mother and inform; attempted to call but the number is disconnected

## 2018-07-17 NOTE — PLAN OF CARE

## 2018-07-17 NOTE — PROGRESS NOTES
Progress Note - Brent Griggs 19 y o  female MRN: [id number]    Unit/Bed#: ICU 09 Encounter: [id number]    Assessment/Plan:    Major depressive disorder agrees to inpatient psychiatric care    Drug Overdose  intentional with suicidal ideation, now medically stable no sequela    Suicide attempt  patient sign 12 for continued behavior health care inpatient    Anxiety    continue p r n  Ativan    Hypokalemia   corrected    A KI    appears pre renal and now resolved with IV fluids    Subjective:   Feels good offers no complaints, denies chest pain shortness of breath nausea vomiting diarrhea no fevers chills appetite good, slightly depressed    Objective:     Vitals: Blood pressure 120/64, pulse 86, temperature 98 6 °F (37 °C), temperature source Temporal, resp  rate 18, height 5' 4" (1 626 m), weight 52 6 kg (115 lb 15 4 oz), last menstrual period 07/01/2018, SpO2 99 %  ,Body mass index is 19 9 kg/m²          Results from last 7 days  Lab Units 07/16/18  0451 07/15/18  1039   WBC Thousand/uL 7 65  --    HEMOGLOBIN g/dL 12 6  --    HEMATOCRIT % 38 2  --    PLATELETS Thousands/uL 194  --    INR   --  1 18*       Results from last 7 days  Lab Units 07/17/18  0453 07/16/18  0451   SODIUM mmol/L 141 143   POTASSIUM mmol/L 4 1 3 8   CHLORIDE mmol/L 110* 109*   CO2 mmol/L 22 20*   BUN mg/dL 10 12   CREATININE mg/dL 0 82 1 13   CALCIUM mg/dL 8 5 8 6   TOTAL PROTEIN g/dL  --  7 2   BILIRUBIN TOTAL mg/dL  --  0 35   ALK PHOS U/L  --  53   ALT U/L  --  34   AST U/L  --  39   GLUCOSE RANDOM mg/dL 88 55*       Scheduled Meds:    Current Facility-Administered Medications:  acetaminophen 650 mg Oral Q6H PRN Ginger Saleem PA-C    famotidine 20 mg Oral BID Judy Jaida, DO    LORazepam 0 5 mg Oral Q8H PRN Judy Jaida, DO    ondansetron 4 mg Intravenous Q6H PRN Ginger Saleem PA-C    sodium chloride 0 9 % with KCl 20 mEq/L 100 mL/hr Intravenous Continuous Judy Jaida, DO Last Rate: Stopped (07/17/18 0900)       Continuous Infusions:    sodium chloride 0 9 % with KCl 20 mEq/L 100 mL/hr Last Rate: Stopped (07/17/18 0900)     Physical exam:  General appearance:  Alert no distress interaction appropriate oriented x3  Head/Eyes:  Nonicteric PERRL EOMI  Neck:  Supple  Lungs: CTA bilateral no wheezing rhonchi or rales  Heart: normal S1 S2 regular  Abdomen: Soft nontender with bowel sounds  Extremities: no edema  Skin: no rash    Invasive Devices     Peripheral Intravenous Line            Peripheral IV 07/15/18 Left Antecubital 2 days                      Counseling / Coordination of Care  Total floor / unit time spent today 30   minutes  Greater than 50% of total time was spent with the patient and / or family counseling and / or coordination of care    A description of the counseling / coordination of care:

## 2018-07-18 PROBLEM — F41.1 GENERALIZED ANXIETY DISORDER: Status: ACTIVE | Noted: 2018-07-18

## 2018-07-18 PROBLEM — F33.2 SEVERE EPISODE OF RECURRENT MAJOR DEPRESSIVE DISORDER, WITHOUT PSYCHOTIC FEATURES (HCC): Status: ACTIVE | Noted: 2018-07-18

## 2018-07-18 LAB
CHOLEST SERPL-MCNC: 121 MG/DL (ref 50–200)
EST. AVERAGE GLUCOSE BLD GHB EST-MCNC: 97 MG/DL
HBA1C MFR BLD: 5 % (ref 4.2–6.3)
HCG SERPL QL: NEGATIVE
HDLC SERPL-MCNC: 38 MG/DL (ref 40–60)
LDLC SERPL CALC-MCNC: 71 MG/DL (ref 0–100)
NONHDLC SERPL-MCNC: 83 MG/DL
TRIGL SERPL-MCNC: 62 MG/DL

## 2018-07-18 PROCEDURE — 80061 LIPID PANEL: CPT | Performed by: PHYSICIAN ASSISTANT

## 2018-07-18 PROCEDURE — 99222 1ST HOSP IP/OBS MODERATE 55: CPT | Performed by: PSYCHIATRY & NEUROLOGY

## 2018-07-18 PROCEDURE — 83036 HEMOGLOBIN GLYCOSYLATED A1C: CPT | Performed by: PHYSICIAN ASSISTANT

## 2018-07-18 PROCEDURE — 84703 CHORIONIC GONADOTROPIN ASSAY: CPT | Performed by: PHYSICIAN ASSISTANT

## 2018-07-18 RX ORDER — ESCITALOPRAM OXALATE 5 MG/1
5 TABLET ORAL DAILY
Status: DISCONTINUED | OUTPATIENT
Start: 2018-07-18 | End: 2018-07-24 | Stop reason: HOSPADM

## 2018-07-18 RX ADMIN — FAMOTIDINE 20 MG: 20 TABLET ORAL at 09:44

## 2018-07-18 RX ADMIN — ESCITALOPRAM OXALATE 5 MG: 5 TABLET, FILM COATED ORAL at 12:29

## 2018-07-18 NOTE — PROGRESS NOTES
Patient a 19year old came as a 61 51 81 from [facility] ICU for SI with a suicide attempt, she swallowed two bottles of motrin pm  She said her reason was because she feels like she is a burden on her family and friends and that they don't understand her  She denied SI, HI and A/VH   @ 2200 patient asked MHT to assist her in signing a 72 hour notice which is on the board  on 72 but she refused to resend  Patient was educated on the matter at hand  Will continue to monitor this shift

## 2018-07-18 NOTE — PLAN OF CARE
RN will meet with patient twice a day to assess for any concerns and teach about prescribed medications and diagnosis  Patient will be taught and encouraged to utilize healthy coping skills

## 2018-07-18 NOTE — TREATMENT PLAN
TREATMENT PLAN REVIEW - [facility] 19 y o  2000 female MRN: [id number]    [address] [place] [address] Room / Bed: [facility] 254/[facility] Encounter: [id number]          Admit Date/Time:  7/17/2018  8:00 PM    Treatment Team: Attending Provider: Dacia Blake; Patient Care Technician: Clinton Flynn; Registered Nurse: Char Lomas, RN; Registered Nurse: Alex Jasso, RN; Patient Care Assistant: Edilma Pelayo; Patient Care Assistant: Matt Garnett; Occupational Therapy Assistant: Rubio Brantley, [address] ; Registered Nurse: Adarsh Romero RN; Registered Nurse:  Olya Link RN; : Shirley Valle    Diagnosis: Principal Problem:    Severe episode of recurrent major depressive disorder, without psychotic features [facility]  Active Problems:    Generalized anxiety disorder    Patient Strengths/Assets: cooperative, good insight, motivation for treatment/growth, patient is on a voluntary commitment    Patient Barriers/Limitations: low self esteem, marital/family conflict    Short Term Goals: decrease in depressive symptoms, decrease in anxiety symptoms, decrease in suicidal thoughts    Long Term Goals: improvement in depression, improvement in anxiety, stabilization of mood, free of suicidal thoughts, acceptance of need for psychiatric medications, acceptance of need for psychiatric treatment, acceptance of need for psychiatric follow up after discharge    Progress Towards Goals: starting psychiatric medications as prescribed    Recommended Treatment: medication management, patient medication education, group therapy, milieu therapy, continued Behavioral Health psychiatric evaluation/assessment process    Treatment Frequency: daily medication monitoring, group and milieu therapy daily, monitoring through interdisciplinary rounds, monitoring through weekly patient care conferences    Expected Discharge Date: 5-7 days    Discharge Plan: referral for outpatient medication management with a psychiatrist, referral for outpatient psychotherapy    Treatment Plan Created/Updated By: Leela Seth

## 2018-07-18 NOTE — INTERVAL H&P NOTE
H&P updated  The patient was examined and she denies changes since transfer  Chest: CTA Bilateral without R/R/W  Heart: RRR without murmer    A/P:  Intentional overdose/depression - admit to behavior Health unit for evaluation treatment    Orders per psychiatrist

## 2018-07-18 NOTE — PLAN OF CARE
Problem: SELF HARM/SUICIDALITY  Goal: Will have no self-injury during hospital stay  INTERVENTIONS:  - Q 15 MINUTES: Routine safety checks  - Q WAKING SHIFT & PRN: Assess risk to determine if routine checks are adequate to maintain patient safety  - Encourage patient to participate actively in care by formulating a plan to combat response to suicidal ideation, identify supports and resources  Outcome: Progressing      Problem: DEPRESSION  Goal: Will be euthymic at discharge  INTERVENTIONS:  - Administer medication as ordered  - Provide emotional support via 1:1 interaction with staff  - Encourage involvement in milieu/groups/activities  - Monitor for social isolation  Outcome: Progressing      Problem: Ineffective Coping  Goal: Cooperates with admission process  Interventions:   - Complete admission process  Outcome: Completed Date Met: 07/17/18    Goal: Identifies ineffective coping skills  Outcome: Progressing    Goal: Identifies healthy coping skills  Outcome: Progressing    Goal: Demonstrates healthy coping skills  Outcome: Progressing    Goal: Participates in unit activities  Interventions:  - Provide therapeutic environment   - Provide required programming   - Redirect inappropriate behaviors   Outcome: Progressing    Goal: Patient/Family participate in treatment and DC plans  Interventions:  - Provide therapeutic environment  Outcome: Progressing    Goal: Patient/Family verbalizes awareness of resources  Outcome: Progressing    Goal: Understands least restrictive measures  Interventions:  - Utilize least restrictive behavior  Outcome: Progressing    Goal: Free from restraint events  - Utilize least restrictive measures   - Provide behavioral interventions   - Redirect inappropriate behaviors   Outcome: Progressing

## 2018-07-18 NOTE — CASE MANAGEMENT
PT was referred to [address] on a 201 from [facility] after an intentional SA by OD on "a percocet," and "a whole bottle of ibuprofen," as a result of PT getting into an argument w/ her SO  Records indicate that PT has a HX of SA by OD on benadryl and by attempting to hang herself  Records indicate that PT informed her family that she took (2) bottles of advil and that this is her 2nd SA within the past 6 months  PT's boyfriend Alexis Nixon call the hospital and reported that he found (2) bottles of motrin pm, 200mg Ibuprophen, 38mg Diphenhydramine, out of the two bottles, only half of one remained, suggesting that the patient ingested one and a half bottles  While on the unit, PT became physically agitated and ended up in 4 point restraints and given PRN medication  CM met w/ PT today  PT inquired about the process for 72 hr notice  CM reviewed PT's 201 status, right to sign 72 hr notice, and also reviewed 302  Discussed and reviewed that PT speak w/ MD regarding this  PT communicated that she doesn't want to be placed on an involuntary commitment and is open to treatment  PT declined to sign 72 hr notice and communicated plan to remain in [address] voluntarily and is hopeful for a D/C early next week after evaluation of symptoms on antidepressants PT reports psychiatrist plans to initiate today  CM reviewed PT's [place] plan  PT signed [address] plan  Copy of [address] [place] [address] plan placed to be filed in medical records  PT confirms that she resides at [address] [facility] in [facility] w/ her boyfriend Audraleila Neville  PT reports that upon D/C from I/P [facility], PT plans and confirms that she is able to return to live there  PT reports that the best telephone number to get in contact w/ her on is (c) [phone]  PT confirms having an active 's licenses; however, denies having her own means of transportation in the community   PT reports that upon D/C from [address] [place] [address], PT's boyfriend Alexis Nixon will be able to support PT w/ transportation services home  PT denies having any current or active providers including psychiatric medication management, therapy, community case management, peer supports, D&A services, PCP, etc      PT denies having a HX of any [facility] admissions  PT denies having a HX of any O/P MH [place]      PT reports that she is currently uninsured  CM reviewed that PATHS referral process and application for MA  PT verbalized understanding this  PT denies having any active RX coverage and denies using any specific pharmacy in the community  CM provided education on the use of [facility] as an uninsured individual as they tend to be less expensive than other local pharmacies  PT denies having any issues w/ drugs or alcohol  PT declines the need for a referral for D&A [place]  UDS upon admission was (-)  PT denies smoking cigarettes or using any forms of tobacco      PT denies having any past or current legal issues  PT denies currently being under the supervision of probation or parole  PT reports that she is currently unemployed and denies receiving any forms of income at this time  PT reports that her SO Jael Rumps supports PT financially at this time  PT reports that she is legally single  PT denies having any children  PT reports being unsure whether or not she has a family HX of any mental illness or drug and alcohol addiction  PT denies currently experiencing any forms of physical, verbal, emotional, or sexual abuse  PT denies identifying w/ any particular Hindu or spiritual beliefs  PT reports that her highest level of completed education is 12th grade through [employer]  PT reports that she graduated from high school in 2016 at a school in [place]  PT denies having any access to weapons or firearms  PT denies having a legal POA, legal guardian, mental health advanced directive, or rep-payee       PT reports that she has natural supports through both immediate and extended family, friends, and her SO  PT reports that she recently was reconnected w/ family and is unsure even where her parents live  PT reports that as a result of her hospitalization they have reconnected  CM reviewed the process for obtaining [facility] funding for O/P [facility]  PT verbalized being in agreement w/ this planning and reviewed being open to O/P therapy and psychiatric medication management  CM agreed to outreach to PT's natural supports and work on initiating referrals and plan to follow up w/ PT accordingly regarding progression in stabilization and D/C planning  PT signed FARHAN's for the following:      Mother Lin Shook and Father Sherrian Dubin (Address Unknown) [phone]    Boyfriend- Tony Simms located @ [facility] [place] [phone] [address] located @ [facility] Dolly Mu[address]oz [place] [address] (B) [phone] (K) [phone]
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
## 2018-07-18 NOTE — DISCHARGE INSTR - OTHER ORDERS
You were provided with the following information, resources, and services throughout [facility]:     Discount prescription cards were added in your discharge folder  [address], housing assistance, pathways referral, homelessness prevention, soup yadira, pharmaceutical assistance, linkage street ministry and additional information added in the discharge folder  Velva Edy is a confidential 7 days/week telephone support service manned by trained mental health consumers  [facility] operates daily but is not able to accept calls between 2AM-6AM  [facility] provides support, a listening ear and can provide information about available services  [facility] specializes in the concerns of mental health consumers, their families and friends  However, we are also here for anyone who has a mental health concern, is confused about or just doesn't know anything about mental health or where to get information  To reach Andriy Snow, call [phone] accepts calls between 6:00 AM to 10:00 AM and from 4:00 PM to 12:00 AM      Crisis Text Line is free, 24/7 support for those in crisis  Text 320080 from anywhere in the [place] to text with a trained Crisis Counselor  [facility] AT [facility] - licensed telephone and mobile crisis services that provide mental health assessments to all age groups regardless of income or insurance  Crisis Intervention operates 24-hour/7 days a week  [address] [place] [address] assists consumer who are experiencing a mental health emergency and lack the resources to assist themselves  Immediate intervention for suicidal and depressed individuals with home visits/outreach being top priority  Crisis can be contacted at [phone]  Dial 2-1-1 to get connected/get help   Free, confidential information & referral available 24/7: Aging Services, Child & Youth Services, Counseling, Education/Training, Food/Shelter/Clothing, Health Services, Parenting, Substance Abuse, Support Groups, Volunteer Opportunities, & much more      Phone: 2-1-1 or [phone], Web: Zulema Yeboah, Email: [email or web address]

## 2018-07-18 NOTE — PROGRESS NOTES
Pt guarded and scant in conversation  Denies SI/HI/AH/VH  +Depression/anxiety  Contracts for safety  Met with Rosalie from [employer]

## 2018-07-18 NOTE — H&P (VIEW-ONLY)
History and Physical - [facility] Internal Medicine    Patient Information: Niki Garcia 19 y o  female MRN: [id number]  Unit/Bed#: ED 16 Encounter: [id number]  Admitting Physician: Arianna Diane PA-C  PCP: No primary care provider on file  Date of Admission:  07/15/18    Assessment/Plan:    Hospital Problem List:     Principal Problem:    Overdose  Active Problems:    Suicide attempt ([address] )    Hypokalemia      Plan for the Primary Problem(s):  · Intentional overdose   · Admit to stepdown on telemetry  Patient told family that she took 2 bottles of advil but she is presenting more like benadryl overdose  Sedated after being given ativan in ED  Family at bedside  They state this is her second suicide attempt in last 6 months  No current diagnosis of depression  Will consult psychiatry  On continual observation  Plan for Additional Problems:   · Hypokalemia- replace potassium  Recheck labs tomorrow    VTE Prophylaxis: Pharmacologic VTE Prophylaxis contraindicated due to low risk  / sequential compression device   Code Status: full code  POLST: There is no POLST form on file for this patient (pre-hospital)    Anticipated Length of Stay:  Patient will be admitted on an Inpatient basis with an anticipated length of stay of  Greater than 2 midnights  Justification for Hospital Stay: patient requires telemetry monitoring and psych consult    Total Time for Visit, including Counseling / Coordination of Care: 45 minutes  Greater than 50% of this total time spent on direct patient counseling and coordination of care  Chief Complaint:   overdose    History of Present Illness:    Niki Garcia is a 19 y o  female who presents with overdose  Friend/boyfriend at bedside  They state she had been fighting with the boyfriend and wanted to move out  She called someone to pick her up but they didn't go to get her  At 2am she called again to thank friend for everything and she told them she took 2 bottles of advil  She asked to go to the hospital  Friends tried to get her to make herself throw up but she was not able to  They state she had tried to hang herself within the past 6 months but they did not seek medical care for her because they didn't think it was a severe enough attempt  She does not have a history of depression and takes no medications  She does not see psych and has no previous psych admissions  Friends admit that she regularly takes percocet which she purchases off the street  No alcohol use or other drug use that they know of  Review of Systems:    Review of Systems   Unable to perform ROS: Mental status change       Past Medical and Surgical History:     History reviewed  No pertinent past medical history  History reviewed  No pertinent surgical history  Meds/Allergies:    Prior to Admission medications    Not on File     I have reviewed home medications with patient family member  Allergies: No Known Allergies    Social History:     Marital Status: Single   Occupation: unemployed  Patient Pre-hospital Living Situation: lives with boyfriend  Patient Pre-hospital Level of Mobility: ambulatory  Patient Pre-hospital Diet Restrictions: none  Substance Use History:   History   Alcohol Use No     History   Smoking Status    Never Smoker   Smokeless Tobacco    Never Used     History   Drug Use    Types: Prescription, Marijuana     Comment: Percocet       Family History:    non-contributory    Physical Exam:     Vitals:   Blood Pressure: 156/80 (07/15/18 0516)  Pulse: (!) 142 (07/15/18 0516)  Temperature: 98 4 °F (36 9 °C) (07/15/18 0331)  Temp Source: Oral (07/15/18 0331)  Respirations: 20 (07/15/18 0516)  Weight - Scale: 59 kg (130 lb) (07/15/18 0331)  SpO2: 100 % (07/15/18 0516)    Physical Exam   Constitutional: She appears well-developed and well-nourished  HENT:   Head: Normocephalic and atraumatic  Eyes: Conjunctivae are normal  Pupils are equal, round, and reactive to light     Neck: Normal range of motion  Neck supple  No tracheal deviation present  No thyromegaly present  Cardiovascular:   tachy   Pulmonary/Chest: Effort normal and breath sounds normal  No respiratory distress  She has no wheezes  Abdominal: Soft  Bowel sounds are normal  She exhibits no distension  There is no tenderness  Musculoskeletal: Normal range of motion  She exhibits no edema, tenderness or deformity  Neurological: She is alert  Disoriented and agitated    Skin: Skin is warm  She is diaphoretic  Vitals reviewed  Additional Data:     Lab Results: I have personally reviewed pertinent reports  Results from last 7 days  Lab Units 07/15/18  0340   WBC Thousand/uL 11 73*   HEMOGLOBIN g/dL 13 7   HEMATOCRIT % 40 4   PLATELETS Thousands/uL 267   NEUTROS PCT % 56   LYMPHS PCT % 36   MONOS PCT % 6   EOS PCT % 1       Results from last 7 days  Lab Units 07/15/18  0340   SODIUM mmol/L 139   POTASSIUM mmol/L 2 6*   CHLORIDE mmol/L 100   CO2 mmol/L 22   BUN mg/dL 16   CREATININE mg/dL 1 06   CALCIUM mg/dL 10 0   TOTAL PROTEIN g/dL 7 9   BILIRUBIN TOTAL mg/dL 0 30   ALK PHOS U/L 56   ALT U/L 23   AST U/L 15   GLUCOSE RANDOM mg/dL 132           Imaging: I have personally reviewed pertinent reports  No results found  EKG, Pathology, and Other Studies Reviewed on Admission:   · EKG: tachy    Allscripts / Epic Records Reviewed: Yes     ** Please Note: This note has been constructed using a voice recognition system   **

## 2018-07-19 PROCEDURE — 99232 SBSQ HOSP IP/OBS MODERATE 35: CPT | Performed by: PSYCHIATRY & NEUROLOGY

## 2018-07-19 RX ORDER — TRAZODONE HYDROCHLORIDE 50 MG/1
50 TABLET ORAL
Status: DISCONTINUED | OUTPATIENT
Start: 2018-07-19 | End: 2018-07-24 | Stop reason: HOSPADM

## 2018-07-19 RX ADMIN — TRAZODONE HYDROCHLORIDE 50 MG: 50 TABLET ORAL at 21:49

## 2018-07-19 RX ADMIN — ESCITALOPRAM OXALATE 5 MG: 5 TABLET, FILM COATED ORAL at 08:50

## 2018-07-19 NOTE — PLAN OF CARE
Problem: SELF HARM/SUICIDALITY  Goal: Will have no self-injury during hospital stay  INTERVENTIONS:  - Q 15 MINUTES: Routine safety checks  - Q WAKING SHIFT & PRN: Assess risk to determine if routine checks are adequate to maintain patient safety  - Encourage patient to participate actively in care by formulating a plan to combat response to suicidal ideation, identify supports and resources   Outcome: Progressing      Problem: DEPRESSION  Goal: Will be euthymic at discharge  INTERVENTIONS:  - Administer medication as ordered  - Provide emotional support via 1:1 interaction with staff  - Encourage involvement in milieu/groups/activities  - Monitor for social isolation   Outcome: Progressing      Problem: Ineffective Coping  Goal: Identifies ineffective coping skills  Outcome: Progressing    Goal: Identifies healthy coping skills  Outcome: Progressing    Goal: Demonstrates healthy coping skills  Outcome: Progressing    Goal: Participates in unit activities  Interventions:  - Provide therapeutic environment   - Provide required programming   - Redirect inappropriate behaviors    Outcome: Progressing    Goal: Patient/Family participate in treatment and DC plans  Interventions:  - Provide therapeutic environment   Outcome: Progressing    Goal: Patient/Family verbalizes awareness of resources  Outcome: Progressing    Goal: Understands least restrictive measures  Interventions:  - Utilize least restrictive behavior   Outcome: Progressing    Goal: Free from restraint events  - Utilize least restrictive measures   - Provide behavioral interventions   - Redirect inappropriate behaviors    Outcome: Progressing      Problem: DISCHARGE PLANNING  Goal: Discharge to home or other facility with appropriate resources  INTERVENTIONS:  - Identify barriers to discharge w/patient and caregiver  - Arrange for needed discharge resources and transportation as appropriate  - Identify discharge learning needs (meds, wound care, etc )  - Arrange for interpretive services to assist at discharge as needed  - Refer to Case Management Department for coordinating discharge planning if the patient needs post-hospital services based on physician/advanced practitioner order or complex needs related to functional status, cognitive ability, or social support system   Outcome: Progressing

## 2018-07-19 NOTE — PROGRESS NOTES
Progress Note - 2[address] [facility] N Agnieszka 19 y o  female MRN: [id number]  Unit/Bed#: U 254-01 Encounter: [id number]    Assessment/Plan   Principal Problem:    Severe episode of recurrent major depressive disorder, without psychotic features ([address] )  Active Problems:    Generalized anxiety disorder    Subjective:  Patient is compliant with medications with no acute side effects  Patient reports low improvement in mood and anxiety reduction in passive death wishes  No manic or psychotic symptoms noted  Patient continues to express insomnia and agreed with plan of considering trazodone tonight  Patient is seen out in milieu today attending groups and is consenting for safety on the unit  Current Medications:    Current Facility-Administered Medications:  acetaminophen 650 mg Oral Q6H PRN Tanisha Can, PA-C   aluminum-magnesium hydroxide-simethicone 30 mL Oral Q4H PRN Tanisha Can, PA-C   benztropine 1 mg Intramuscular Q6H PRN Tanisha Can, PA-C   benztropine 1 mg Oral Q6H PRN Tanisha Can, PA-C   escitalopram 5 mg Oral Daily [facility]   haloperidol 5 mg Oral Q6H PRN Tanisha Can, PA-C   haloperidol lactate 5 mg Intramuscular Q6H PRN Tanisha Can, PA-C   LORazepam 2 mg Intramuscular Q6H PRN Tanisha Can, PA-C   LORazepam 1 mg Oral Q6H PRN Tanisha Can, PA-C   magnesium hydroxide 30 mL Oral Daily PRN Tanisha Can, PA-C   OLANZapine 10 mg Intramuscular Q3H PRN Tanisha Can, PA-C   OLANZapine 10 mg Oral Q3H PRN Tanisha Can, PA-C   risperiDONE 1 mg Oral Q3H PRN Tanisha Can, PA-C   traZODone 50 mg Oral HS PRN Tanisha Can, PA-C   traZODone 50 mg Oral HS [address] [facility] Medications: all current active meds have been reviewed  Vital signs in last 24 hours:  Temp:  [98 1 °F (36 7 °C)-98 2 °F (36 8 °C)] 98 1 °F (36 7 °C)  HR:  [77-78] 77  Resp:  [18] 18  BP: (125-147)/(75-76) 125/76    Laboratory results:     I have personally reviewed all pertinent laboratory/tests results  Admission Labs:   Admission on 07/17/2018   Component Date Value    Preg, Serum 07/18/2018 Negative     Cholesterol 07/18/2018 121     Triglycerides 07/18/2018 62     HDL, Direct 07/18/2018 38*    LDL Calculated 07/18/2018 71     Non-HDL-Chol (CHOL-HDL) 07/18/2018 83     Hemoglobin A1C 07/18/2018 5 0     EAG 07/18/2018 97        Psychiatric Review of Systems:  Behavior over the last 24 hours:  Slow improvement  Sleep: insomnia  Appetite: normal  Medication side effects: yes (mild nausea)  ROS: mild nausea    Mental Status Evaluation:  Appearance:  casually dressed   Behavior:  guarded   Speech:  soft   Mood:  anxious and depressed   Affect:  constricted   Language naming objects and repeating phrases   Thought Process:  circumstantial   Thought Content:  obsessions   Perceptual Disturbances: None   Risk Potential: Suicidal Ideations without plan, Homicidal Ideations none and Potential for Aggression No   Sensorium:  person, place and time/date   Cognition:  grossly intact   Consciousness:  awake    Attention: attention span appeared shorter than expected for age   Insight:  limited   Judgment: limited   Intellect fair   Gait/Station: normal gait/station and normal balance   Motor Activity: no abnormal movements     Memory: Short and long term memory  fair     Progress Toward Goals: progressing    Recommended Treatment:   Add Trazodone 50 mg HS for insomnia  Continue with group therapy, milieu therapy and occupational therapy      Continue following current medications:   Current Facility-Administered Medications:  acetaminophen 650 mg Oral Q6H PRN Mitra Byrd PA-C   aluminum-magnesium hydroxide-simethicone 30 mL Oral Q4H PRN Mitra Byrd PA-C   benztropine 1 mg Intramuscular Q6H PRN Mitra Byrd PA-C   benztropine 1 mg Oral Q6H PRN Mitra Byrd PA-C   escitalopram 5 mg Oral Daily Yash Colmenares   haloperidol 5 mg Oral Q6H PRN Davey Larger, PA-C   haloperidol lactate 5 mg Intramuscular Q6H PRN Davey Larger, PA-C   LORazepam 2 mg Intramuscular Q6H PRN Davey Larger, PA-C   LORazepam 1 mg Oral Q6H PRN Davey Larger, PA-C   magnesium hydroxide 30 mL Oral Daily PRN Davey Larger, PA-C   OLANZapine 10 mg Intramuscular Q3H PRN Davey Larger, PA-C   OLANZapine 10 mg Oral Q3H PRN Davey Larger, PA-C   risperiDONE 1 mg Oral Q3H PRN Davey Larger, PA-C   traZODone 50 mg Oral HS PRN Davey Larger, PA-C   traZODone 50 mg Oral HS Yash Colmenares       Risks, benefits and possible side effects of Medications:   Risks, benefits, and possible side effects of medications explained to patient and patient verbalizes understanding  Risks of medications in pregnancy explained if female patient  Patient verbalizes understanding and agrees to notify her doctor if she becomes pregnant  This note has been constructed using a voice recognition system  There may be translation, syntax,  or grammatical errors  If you have any questions, please contact the dictating provider

## 2018-07-19 NOTE — PROGRESS NOTES
Pt appears calm  Pt states feeling tired today because "woke up early, 6 a m  And I'm not used to that " Pt denies all psychiatric symptoms at this time  Pt is visual in the milieu; socializes w/select peers and interactions are appropriate  Pt states her goal for today is to "keep reading the book I'm ready and stay awake the whole day and go to groups " Continue to monitor

## 2018-07-20 PROCEDURE — 99232 SBSQ HOSP IP/OBS MODERATE 35: CPT | Performed by: PSYCHIATRY & NEUROLOGY

## 2018-07-20 RX ADMIN — TRAZODONE HYDROCHLORIDE 50 MG: 50 TABLET ORAL at 21:06

## 2018-07-20 RX ADMIN — ESCITALOPRAM OXALATE 5 MG: 5 TABLET, FILM COATED ORAL at 08:53

## 2018-07-20 NOTE — CASE MANAGEMENT
CM outreached to PT's Mother Nupur Arizmendi C) [phone] and provided an update regarding PT's diagnosis, medications, and current symptom presentation  Reviewed referral for [facility] funding for O/P [facility] services and PATHS applying for MA w/ PT  Discussed PT's projected D/C planning for Tuesday of next week  Loli Epps verbalized being in agreement w/ this  Loli Epps reports that she came to visit PT last night and reports that the visit went well  Agreed to be in contact regarding progression in stabilization and D/C planning

## 2018-07-20 NOTE — PROGRESS NOTES
Progress Note - [address] [facility] N Agnies[address]ka 19 y o  female MRN: [id number]  Unit/Bed#: U 254-01 Encounter: [id number]    Assessment/Plan   Principal Problem:    Severe episode of recurrent major depressive disorder, without psychotic features ([address] )  Active Problems:    Generalized anxiety disorder      Subjective:  Patient is compliant with medications with no acute side effects  Patient reports improved sleep after addition of trazodone  She does report improvement in mood and anxiety and reduction in passive death wishes  Patient is consenting for safety on the unit and is seen attending groups  Current Medications:    Current Facility-Administered Medications:  acetaminophen 650 mg Oral Q6H PRN [place], PA-C   aluminum-magnesium hydroxide-simethicone 30 mL Oral Q4H PRN [place], PA-C   benztropine 1 mg Intramuscular Q6H PRN Armida Terrance, PA-C   benztropine 1 mg Oral Q6H PRN Armida Terrance, PA-C   escitalopram 5 mg Oral Daily [facility] Colmenares   haloperidol 5 mg Oral Q6H PRN Armida Terrance, PA-C   haloperidol lactate 5 mg Intramuscular Q6H PRN Armida Terrance, PA-C   LORazepam 2 mg Intramuscular Q6H PRN Armida Terrance, PA-C   LORazepam 1 mg Oral Q6H PRN Armida Terrance, PA-C   magnesium hydroxide 30 mL Oral Daily PRN Armida Terrance, PA-C   OLANZapine 10 mg Intramuscular Q3H PRN [place], PA-C   OLANZapine 10 mg Oral Q3H PRN [place], PA-C   risperiDONE 1 mg Oral Q3H PRN Armida Terrance, PA-C   traZODone 50 mg Oral HS PRN [place], PA-C   traZODone 50 mg Oral HS [address] Medications: all current active meds have been reviewed  Vital signs in last 24 hours:  Temp:  [97 2 °F (36 2 °C)] 97 2 °F (36 2 °C)  HR:  [65-81] 81  Resp:  [16-17] 17  BP: (114-131)/(61-65) 114/65    Laboratory results:    I have personally reviewed all pertinent laboratory/tests results    Labs in last 72 hours:   Recent Labs 07/18/18   0548   CHOL  121   HDL  38*   TRIG  62   LDLCALC  71   PREGSERUM  Negative     Admission Labs:   Admission on 07/17/2018   Component Date Value    Preg, Serum 07/18/2018 Negative     Cholesterol 07/18/2018 121     Triglycerides 07/18/2018 62     HDL, Direct 07/18/2018 38*    LDL Calculated 07/18/2018 71     Non-HDL-Chol (CHOL-HDL) 07/18/2018 83     Hemoglobin A1C 07/18/2018 5 0     EAG 07/18/2018 97        Psychiatric Review of Systems:  Behavior over the last 24 hours:  improving  Sleep: normal  Appetite: normal  Medication side effects: No  ROS: no complaints    Mental Status Evaluation:  Appearance:  casually dressed   Behavior:  guarded   Speech:  soft   Mood:  anxious and depressed   Affect:  constricted   Language naming objects and repeating phrases   Thought Process:  normal   Thought Content:  obsessions   Perceptual Disturbances: None   Risk Potential: Suicidal Ideations without plan, Homicidal Ideations none and Potential for Aggression No   Sensorium:  person and place   Cognition:  grossly intact   Consciousness:  awake    Attention: attention span appeared shorter than expected for age   Insight:  limited   Judgment: limited   Intellect fair   Gait/Station: normal gait/station   Motor Activity: no abnormal movements     Memory: Short and long term memory  fair     Progress Toward Goals: progressing    Recommended Treatment:   Continue with group therapy, milieu therapy and occupational therapy      Continue following current medications:   Current Facility-Administered Medications:  acetaminophen 650 mg Oral Q6H PRN Tyrese Adan, PA-HERSON   aluminum-magnesium hydroxide-simethicone 30 mL Oral Q4H PRN Tyrese Gearing, PA-HERSON   benztropine 1 mg Intramuscular Q6H PRN DEX Beard-HERSON   benztropine 1 mg Oral Q6H PRN Tyresemilvia Tateing, PA-HERSON   escitalopram 5 mg Oral Daily Yash Colmenares   haloperidol 5 mg Oral Q6H PRN Tyrese Gearing, PA-HERSON   haloperidol lactate 5 mg Intramuscular Q6H PRN Moi Och, PA-C   LORazepam 2 mg Intramuscular Q6H PRN Moi Och, PA-C   LORazepam 1 mg Oral Q6H PRN Moi Och, PA-C   magnesium hydroxide 30 mL Oral Daily PRN Moi Och, PA-C   OLANZapine 10 mg Intramuscular Q3H PRN Moi Och, PA-C   OLANZapine 10 mg Oral Q3H PRN Moi Och, PA-C   risperiDONE 1 mg Oral Q3H PRN Moi Och, PA-C   traZODone 50 mg Oral HS PRN Moi Och, PA-C   traZODone 50 mg Oral HS Yash Colmenares       Risks, benefits and possible side effects of Medications:   Risks, benefits, and possible side effects of medications explained to patient and patient verbalizes understanding  Risks of medications in pregnancy explained if female patient  Patient verbalizes understanding and agrees to notify her doctor if she becomes pregnant  This note has been constructed using a voice recognition system  There may be translation, syntax,  or grammatical errors  If you have any questions, please contact the dictating provider

## 2018-07-20 NOTE — PROGRESS NOTES
Pt pleasant during interaction  Denies SI and reports improved depression and anxiety  Reports sleeping better with trazodone  Pt provided with written information about scheduled medications  Visible in milieu throughout shift and attending groups

## 2018-07-20 NOTE — PROGRESS NOTES
Pt denies symptoms or concerns, scant but pleasant in conversation, identifies coloring as a coping skill  Social with peers, out in milieu, cooperative  Pt's father Naldo Devine called at [phone] with concerns about where pt would be living after discharge  Pt's father states "I'm a bit concerned she's going back to her boyfriend, she's going back to the same situation that led to this " Pt's father states their relationship is toxic and that his daughter is "saying all the right things " Pt's father states he has a two bedroom apartment in [place] and wants pt to move in with him, reports that she plans to move within weeks but he is concerned that she will be living with her boyfriend until then

## 2018-07-20 NOTE — CASE MANAGEMENT
CM met w/ PT today  CM provided an update regarding PT's progression in stabilization and D/C planning  Discussed and reviewed process for completing a walk-in in-take assessment for [facility] for O/P [address] services until PT's MA becomes active  PT verbalized being in agreement w/ this  Discussed projected D/C planning for Tuesday  PT communicates that she feels she will be ready for D/C by Tuesday  PT communicated that she has noticed a significant and positive difference in her mood since being placed on the antidepressant  PT reports feeling hopeful that medication is helping her stabilize  CM spent time w/ PT and provided psychoeducation and encouraged PT to follow through w/ individual psychotherapy as well  Discussed CM's coordination w/ PT's mother  PT is hopeful that family will be able to pick PT up on Tuesday and PT reports feeling excited that she will be able to see her sister on Tuesday as well  PT verbalized being in agreement w/ this planning  Agreed to be in contact regarding progression in stabilization and D/C planning

## 2018-07-21 PROCEDURE — 99231 SBSQ HOSP IP/OBS SF/LOW 25: CPT | Performed by: PSYCHIATRY & NEUROLOGY

## 2018-07-21 RX ADMIN — ESCITALOPRAM OXALATE 5 MG: 5 TABLET, FILM COATED ORAL at 09:33

## 2018-07-21 RX ADMIN — TRAZODONE HYDROCHLORIDE 50 MG: 50 TABLET ORAL at 21:08

## 2018-07-21 NOTE — PROGRESS NOTES
Patient visible on unit interacts with peers positive for meds and meals  Denies SI, HI and A/HV  Will continue to monitor

## 2018-07-21 NOTE — PROGRESS NOTES
Patient moved from   254-1 to   260-1  Belongings moved from [place] 254-1 to [place] 260-1  NOTE: Notice in locker that some belongings are in [address]  [address] closet

## 2018-07-21 NOTE — PROGRESS NOTES
Pt pleasant in conversation  States having support with her boyfriend and lives with him  Discussed family difficulties related to not supportive  Reports needing to look for job after discharge  Pt said she enjoys art  She mentioned previous job was working at [employer] and was harassed to be stressor leading to admission  Pt positive with medication and willing to have follow up care in community

## 2018-07-21 NOTE — PROGRESS NOTES
Progress Note - [address] [facility] [address] Agnies[address]ka 19 y o  female MRN: [id number]  Unit/Bed#: U 254-01 Encounter: [id number]    Assessment/Plan   Principal Problem:    Severe episode of recurrent major depressive disorder, without psychotic features ([facility] [address] )  Active Problems:    Generalized anxiety disorder      Behavior over the last 24 hours:  improved  Sleep: normal  Appetite: normal  Medication side effects: No  ROS: no complaints    Mental Status Evaluation:  Appearance:  age appropriate   Behavior:  normal   Speech:  normal pitch and normal volume   Mood:  normal   Affect:  normal   Thought Process:  normal   Thought Content:  normal   Perceptual Disturbances: None   Risk Potential: buster for safety in unit milieu   Sensorium:  person, place and time/date   Cognition:  grossly intact   Consciousness:  alert    Attention: attention span and concentration were age appropriate   Insight:  age appropriate   Judgment: age appropriate   Gait/Station: normal gait/station   Motor Activity: no abnormal movements     Progress Toward Goals: patient acclimating in milieu and reporting improvement compared to admission    Recommended Treatment: Continue with group therapy, milieu therapy and occupational therapy  Risks, benefits and possible side effects of Medications:   Risks, benefits, and possible side effects of medications explained to patient and patient verbalizes understanding        Medications:   all current active meds have been reviewed and current meds:   Current Facility-Administered Medications   Medication Dose Route Frequency    acetaminophen (TYLENOL) tablet 650 mg  650 mg Oral Q6H PRN    aluminum-magnesium hydroxide-simethicone (MYLANTA) 200-200-20 mg/5 mL oral suspension 30 mL  30 mL Oral Q4H PRN    benztropine (COGENTIN) injection 1 mg  1 mg Intramuscular Q6H PRN    benztropine (COGENTIN) tablet 1 mg  1 mg Oral Q6H PRN    escitalopram (LEXAPRO) tablet 5 mg  5 mg Oral Daily    haloperidol (HALDOL) tablet 5 mg  5 mg Oral Q6H PRN    haloperidol lactate (HALDOL) injection 5 mg  5 mg Intramuscular Q6H PRN    LORazepam (ATIVAN) 2 mg/mL injection 2 mg  2 mg Intramuscular Q6H PRN    LORazepam (ATIVAN) tablet 1 mg  1 mg Oral Q6H PRN    magnesium hydroxide (MILK OF MAGNESIA) 400 mg/5 mL oral suspension 30 mL  30 mL Oral Daily PRN    OLANZapine (ZyPREXA) IM injection 10 mg  10 mg Intramuscular Q3H PRN    OLANZapine (ZyPREXA) tablet 10 mg  10 mg Oral Q3H PRN    risperiDONE (RisperDAL M-TABS) dispersible tablet 1 mg  1 mg Oral Q3H PRN    traZODone (DESYREL) tablet 50 mg  50 mg Oral HS PRN    traZODone (DESYREL) tablet 50 mg  50 mg Oral HS

## 2018-07-22 PROCEDURE — 99231 SBSQ HOSP IP/OBS SF/LOW 25: CPT | Performed by: PSYCHIATRY & NEUROLOGY

## 2018-07-22 RX ADMIN — ESCITALOPRAM OXALATE 5 MG: 5 TABLET, FILM COATED ORAL at 08:54

## 2018-07-22 RX ADMIN — TRAZODONE HYDROCHLORIDE 50 MG: 50 TABLET ORAL at 21:11

## 2018-07-22 NOTE — PROGRESS NOTES
Progress Note - 2[address] [facility] N Agnies[address]ka 19 y o  female MRN: [id number]  Unit/Bed#: U 260-01 Encounter: [id number]    Assessment/Plan   Principal Problem:    Severe episode of recurrent major depressive disorder, without psychotic features ([facility] [address] )  Active Problems:    Generalized anxiety disorder      Behavior over the last 24 hours:  improved  Sleep: normal  Appetite: normal  Medication side effects: No  ROS: no complaints    Mental Status Evaluation:  Appearance:  age appropriate   Behavior:  normal   Speech:  normal pitch and normal volume   Mood:  normal   Affect:  normal   Thought Process:  normal   Thought Content:  normal   Perceptual Disturbances: None   Risk Potential: buster for safety within the confines of the unit [facility]   Sensorium:  person, place and time/date   Cognition:  grossly intact   Consciousness:  alert    Attention: attention span and concentration were age appropriate   Insight:  age appropriate   Judgment: age appropriate   Gait/Station: normal gait/station   Motor Activity: no abnormal movements     Progress Toward Goals: patient reports improvement since admission  She is hoping for discharge soon  atttending groups and acclimating in the milieu    Recommended Treatment: Continue with group therapy, milieu therapy and occupational therapy  Risks, benefits and possible side effects of Medications:   Risks, benefits, and possible side effects of medications explained to patient and patient verbalizes understanding        Medications:   all current active meds have been reviewed and current meds:   Current Facility-Administered Medications   Medication Dose Route Frequency    acetaminophen (TYLENOL) tablet 650 mg  650 mg Oral Q6H PRN    aluminum-magnesium hydroxide-simethicone (MYLANTA) 200-200-20 mg/5 mL oral suspension 30 mL  30 mL Oral Q4H PRN    benztropine (COGENTIN) injection 1 mg  1 mg Intramuscular Q6H PRN    benztropine (COGENTIN) tablet 1 mg  1 mg Oral Q6H PRN    escitalopram (LEXAPRO) tablet 5 mg  5 mg Oral Daily    haloperidol (HALDOL) tablet 5 mg  5 mg Oral Q6H PRN    haloperidol lactate (HALDOL) injection 5 mg  5 mg Intramuscular Q6H PRN    LORazepam (ATIVAN) 2 mg/mL injection 2 mg  2 mg Intramuscular Q6H PRN    LORazepam (ATIVAN) tablet 1 mg  1 mg Oral Q6H PRN    magnesium hydroxide (MILK OF MAGNESIA) 400 mg/5 mL oral suspension 30 mL  30 mL Oral Daily PRN    OLANZapine (ZyPREXA) IM injection 10 mg  10 mg Intramuscular Q3H PRN    OLANZapine (ZyPREXA) tablet 10 mg  10 mg Oral Q3H PRN    risperiDONE (RisperDAL M-TABS) dispersible tablet 1 mg  1 mg Oral Q3H PRN    traZODone (DESYREL) tablet 50 mg  50 mg Oral HS PRN    traZODone (DESYREL) tablet 50 mg  50 mg Oral HS

## 2018-07-23 PROCEDURE — 99232 SBSQ HOSP IP/OBS MODERATE 35: CPT | Performed by: PSYCHIATRY & NEUROLOGY

## 2018-07-23 RX ADMIN — TRAZODONE HYDROCHLORIDE 50 MG: 50 TABLET ORAL at 21:21

## 2018-07-23 RX ADMIN — ESCITALOPRAM OXALATE 5 MG: 5 TABLET, FILM COATED ORAL at 08:55

## 2018-07-23 RX ADMIN — TRAZODONE HYDROCHLORIDE 50 MG: 50 TABLET ORAL at 21:31

## 2018-07-23 NOTE — PROGRESS NOTES
Pt remains pleasant and social with peers on the unit  Denies SI/HI, denies any concerns or questions regarding treatment at this time  Will monitor

## 2018-07-23 NOTE — PROGRESS NOTES
Pt states she has some anxiety about being discharged tomorrow  She shared that her boyfriend and his dad are very supportive as opposed to her family who aren't supportive  She shared that she plans to find another job and would like to be an  someday  She is social and interactive  Will monitor

## 2018-07-23 NOTE — PLAN OF CARE
Problem: SELF HARM/SUICIDALITY  Goal: Will have no self-injury during hospital stay  INTERVENTIONS:  - Q 15 MINUTES: Routine safety checks  - Q WAKING SHIFT & PRN: Assess risk to determine if routine checks are adequate to maintain patient safety  - Encourage patient to participate actively in care by formulating a plan to combat response to suicidal ideation, identify supports and resources   Outcome: Progressing      Problem: DEPRESSION  Goal: Will be euthymic at discharge  INTERVENTIONS:  - Administer medication as ordered  - Provide emotional support via 1:1 interaction with staff  - Encourage involvement in milieu/groups/activities  - Monitor for social isolation   Outcome: Progressing

## 2018-07-23 NOTE — PROGRESS NOTES
Progress Note - [id number] [facility] EFRA Aaron 19 y o  female MRN: [id number]   Unit/Bed#: U 260-01 Encounter: [id number]    Behavior over the last 24 hours: some improvement  Nicole Almanzar reports feeling less anxious and less depressed  She states she only had anxiety on the 1st day of admission after that she has not had any anxiety  She states that she has been social in groups she feels that she is ready for discharge tomorrow she is looking forward to finding a new job as she feels her old job was a major stressor that brought her to this admission  She denies auditory and visual hallucinations  She is not having any thoughts of suicidal or homicidal ideation  She is tolerating her diet and not having any issues with sleep  She is tolerating her medication without side effect      Sleep: normal  Appetite: normal  Medication side effects: No   ROS: no complaints, denies any headache, shortness of breath, chest pain or abdominal pain    Mental Status Evaluation:    Appearance:  age appropriate, casually dressed   Behavior:  cooperative, calm   Speech:  normal rate, normal volume, normal pitch   Mood:  euthymic   Affect:  normal range and intensity   Thought Process:  logical, goal directed   Associations: intact associations   Thought Content:  no overt delusions   Perceptual Disturbances: no auditory hallucinations, no visual hallucinations   Risk Potential: Suicidal ideation - None  Homicidal ideation - None  Potential for aggression - No   Sensorium:  oriented to person, place and time/date   Memory:  recent and remote memory grossly intact   Consciousness:  alert and awake   Attention: attention span and concentration are age appropriate   Insight:  improving   Judgment: improving   Gait/Station: normal gait/station and normal balance   Motor Activity: no abnormal movements     Vital signs in last 24 hours:    Temp:  [97 1 °F (36 2 °C)-97 9 °F (36 6 °C)] 97 1 °F (36 2 °C)  HR:  [70-81] 70  Resp:  [16] 16  BP: (115-132)/(58-69) 115/58    Laboratory results:    I have personally reviewed all pertinent laboratory/tests results  Most Recent Labs:   Lab Results   Component Value Date    WBC 7 65 07/16/2018    RBC 4 82 07/16/2018    HGB 12 6 07/16/2018    HCT 38 2 07/16/2018     07/16/2018    RDW 13 8 07/16/2018    NEUTROABS 6 58 07/15/2018     07/17/2018    K 4 1 07/17/2018     (H) 07/17/2018    CO2 22 07/17/2018    BUN 10 07/17/2018    CREATININE 0 82 07/17/2018    GLUCOSE 88 07/17/2018    CALCIUM 8 5 07/17/2018    AST 39 07/16/2018    ALT 34 07/16/2018    ALKPHOS 53 07/16/2018    PROT 7 2 07/16/2018    BILITOT 0 35 07/16/2018    CHOL 121 07/18/2018    HDL 38 (L) 07/18/2018    TRIG 62 07/18/2018    LDLCALC 71 07/18/2018    AMMONIA <10 (L) 07/16/2018    [id number] 2 097 07/15/2018    PREGSERUM Negative 07/18/2018       Progress Toward Goals: some improvement   Patient attending groups  No SI or HI  No auditory or visual hallucinations  Making plans toward her future with thoughts of going back to college for art  Insight and judgment are improving as she is thinking about employment post discharge she also discussed the need for therapy and medication compliance  Assessment/Plan   Principal Problem:    Severe episode of recurrent major depressive disorder, without psychotic features ([facility] [address] )  Active Problems:    Generalized anxiety disorder    Recommended Treatment:     Planned medication and treatment changes: All current active medications have been reviewed    Encourage group therapy, milieu therapy and occupational therapy  Behavioral Health checks every 5 minutes  Continue current medications:    Current Facility-Administered Medications:  acetaminophen 650 mg Oral Q6H PRN Sheliah Genre, PA-C   aluminum-magnesium hydroxide-simethicone 30 mL Oral Q4H PRN Sheliah Genre, PA-C   benztropine 1 mg Intramuscular Q6H PRN Sheliah Genre, PA-C   benztropine 1 mg Oral Q6H PRN Zuleima Villatoro, PA-C   escitalopram 5 mg Oral Daily Yash Colmenares   haloperidol 5 mg Oral Q6H PRN Zuleima Villatoro, PA-C   haloperidol lactate 5 mg Intramuscular Q6H PRN Zuleima Villatoro, PA-C   LORazepam 2 mg Intramuscular Q6H PRN Zuleima Villatoro, PA-C   LORazepam 1 mg Oral Q6H PRN Zuleima Villatoro, PA-C   magnesium hydroxide 30 mL Oral Daily PRN Zuleima Villatoro, PA-C   OLANZapine 10 mg Intramuscular Q3H PRN Zuleima Villatoro, PA-C   OLANZapine 10 mg Oral Q3H PRN Zuleima Villatoro, PA-C   risperiDONE 1 mg Oral Q3H PRN Zuleima Villatoro, PA-C   traZODone 50 mg Oral HS PRN Zuleima Villatoro, PA-C   traZODone 50 mg Oral HS Yash Colmenares       Risks / Benefits of Treatment:    Risks, benefits, and possible side effects of medications explained to patient and patient verbalizes understanding and agreement for treatment  Risks of medications in pregnancy explained if female patient  Patient verbalizes understanding and agrees to notify her doctor if she becomes pregnant  Counseling / Coordination of Care:    Patient's progress discussed with staff in treatment team meeting  Medications, treatment progress and treatment plan reviewed with patient  Supportive counseling provided to the patient

## 2018-07-23 NOTE — CASE MANAGEMENT
CM met w/ PT today  CM reviewed PT's D/C plan for tomorrow, transportation, and aftercare services  PT expresses readiness for D/C and verbalized being in support of D/C plan  PT communicated that her boyfriend plans to pick PT up around 11:00am tomorrow

## 2018-07-23 NOTE — PROGRESS NOTES
Pt denies anxiety or depression   She is visible on the unit  Compliant with meds and attends groups  Will monitor

## 2018-07-24 VITALS
RESPIRATION RATE: 18 BRPM | HEIGHT: 64 IN | SYSTOLIC BLOOD PRESSURE: 119 MMHG | BODY MASS INDEX: 19.75 KG/M2 | OXYGEN SATURATION: 99 % | WEIGHT: 115.7 LBS | DIASTOLIC BLOOD PRESSURE: 56 MMHG | HEART RATE: 107 BPM | TEMPERATURE: 98.1 F

## 2018-07-24 PROCEDURE — 99239 HOSP IP/OBS DSCHRG MGMT >30: CPT | Performed by: PSYCHIATRY & NEUROLOGY

## 2018-07-24 RX ORDER — TRAZODONE HYDROCHLORIDE 50 MG/1
50 TABLET ORAL
Qty: 30 TABLET | Refills: 0 | Status: SHIPPED | OUTPATIENT
Start: 2018-07-24 | End: 2018-08-23

## 2018-07-24 RX ORDER — ESCITALOPRAM OXALATE 5 MG/1
5 TABLET ORAL DAILY
Qty: 30 TABLET | Refills: 0 | Status: SHIPPED | OUTPATIENT
Start: 2018-07-25 | End: 2018-08-24

## 2018-07-24 RX ADMIN — ESCITALOPRAM OXALATE 5 MG: 5 TABLET, FILM COATED ORAL at 09:11

## 2018-07-24 NOTE — CASE MANAGEMENT
CM met w/ PT today  CM reviewed PT's D/C plan  PT reports that her boyfriend plans to pick PT up a little after 10:00am  CM reviewed PT's aftercare services  PT expresses readiness for D/C and verbalized being in support of D/C plan

## 2018-07-24 NOTE — DISCHARGE INSTR - LAB
Contact Information: If you have any questions, concerns, pended studies, tests and/or procedures, or emergencies regarding your inpatient behavioral health visit  Please contact [facility] [phone] and ask to speak to a , nurse or physician  A contact is available 24 hours/ 7 days a week at this number  Summary of Procedures Performed During your Stay:  Below is a list of major procedures performed during your hospital stay and a summary of results:  - No major procedures performed  Pending Studies     None        If studies are pending at discharge, follow up with your PCP and/or referring provider
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
## 2023-08-31 ENCOUNTER — HOSPITAL ENCOUNTER (EMERGENCY)
Facility: HOSPITAL | Age: 23
Discharge: HOME | End: 2023-08-31
Attending: FAMILY MEDICINE | Admitting: FAMILY MEDICINE
Payer: COMMERCIAL

## 2023-08-31 VITALS
SYSTOLIC BLOOD PRESSURE: 122 MMHG | WEIGHT: 120 LBS | TEMPERATURE: 97.4 F | HEART RATE: 82 BPM | BODY MASS INDEX: 20.49 KG/M2 | HEIGHT: 64 IN | RESPIRATION RATE: 22 BRPM | OXYGEN SATURATION: 99 % | DIASTOLIC BLOOD PRESSURE: 64 MMHG

## 2023-08-31 DIAGNOSIS — E86.0 DEHYDRATION: Primary | ICD-10-CM

## 2023-08-31 DIAGNOSIS — F19.10 POLYSUBSTANCE ABUSE (HCC): ICD-10-CM

## 2023-08-31 DIAGNOSIS — R53.83 FATIGUE: ICD-10-CM

## 2023-08-31 LAB
AMPHETAMINES SERPL QL SCN: POSITIVE
ANION GAP SERPL CALCULATED.3IONS-SCNC: 9 MMOL/L
ATRIAL RATE: 61 BPM
BARBITURATES UR QL: NEGATIVE
BASOPHILS # BLD AUTO: 0 THOUSANDS/ÂΜL (ref 0–0.1)
BASOPHILS NFR BLD AUTO: 0 % (ref 0–1)
BENZODIAZ UR QL: NEGATIVE
BILIRUB UR QL STRIP: NEGATIVE
BUN SERPL-MCNC: 17 MG/DL (ref 5–25)
CALCIUM SERPL-MCNC: 9.6 MG/DL (ref 8.4–10.2)
CHLORIDE SERPL-SCNC: 103 MMOL/L (ref 96–108)
CLARITY UR: ABNORMAL
CO2 SERPL-SCNC: 22 MMOL/L (ref 21–32)
COCAINE UR QL: NEGATIVE
COLOR UR: YELLOW
CREAT SERPL-MCNC: 0.81 MG/DL (ref 0.6–1.3)
EOSINOPHIL # BLD AUTO: 0.02 THOUSAND/ÂΜL (ref 0–0.61)
EOSINOPHIL NFR BLD AUTO: 0 % (ref 0–6)
ERYTHROCYTE [DISTWIDTH] IN BLOOD BY AUTOMATED COUNT: 14.2 % (ref 11.6–15.1)
EXT PREGNANCY TEST URINE: NEGATIVE
EXT. CONTROL: NORMAL
GFR SERPL CREATININE-BSD FRML MDRD: 101 ML/MIN/1.73SQ M
GLUCOSE SERPL-MCNC: 102 MG/DL (ref 65–140)
GLUCOSE UR STRIP-MCNC: NEGATIVE MG/DL
HCT VFR BLD AUTO: 40.8 % (ref 34.8–46.1)
HGB BLD-MCNC: 13.2 G/DL (ref 11.5–15.4)
HGB UR QL STRIP.AUTO: NEGATIVE
IMM GRANULOCYTES # BLD AUTO: 0.01 THOUSAND/UL (ref 0–0.2)
IMM GRANULOCYTES NFR BLD AUTO: 0 % (ref 0–2)
KETONES UR STRIP-MCNC: ABNORMAL MG/DL
LEUKOCYTE ESTERASE UR QL STRIP: NEGATIVE
LYMPHOCYTES # BLD AUTO: 2.02 THOUSANDS/ÂΜL (ref 0.6–4.47)
LYMPHOCYTES NFR BLD AUTO: 27 % (ref 14–44)
MCH RBC QN AUTO: 25.4 PG (ref 26.8–34.3)
MCHC RBC AUTO-ENTMCNC: 32.4 G/DL (ref 31.4–37.4)
MCV RBC AUTO: 79 FL (ref 82–98)
MONOCYTES # BLD AUTO: 0.44 THOUSAND/ÂΜL (ref 0.17–1.22)
MONOCYTES NFR BLD AUTO: 6 % (ref 4–12)
NEUTROPHILS # BLD AUTO: 5.07 THOUSANDS/ÂΜL (ref 1.85–7.62)
NEUTS SEG NFR BLD AUTO: 67 % (ref 43–75)
NITRITE UR QL STRIP: NEGATIVE
NRBC BLD AUTO-RTO: 0 /100 WBCS
OPIATES UR QL SCN: POSITIVE
OXYCODONE+OXYMORPHONE UR QL SCN: NEGATIVE
P AXIS: 66 DEGREES
PCP UR QL: NEGATIVE
PH UR STRIP.AUTO: 6.5 [PH]
PLATELET # BLD AUTO: 227 THOUSANDS/UL (ref 149–390)
PMV BLD AUTO: 10 FL (ref 8.9–12.7)
POTASSIUM SERPL-SCNC: 4.2 MMOL/L (ref 3.5–5.3)
PR INTERVAL: 132 MS
PROT UR STRIP-MCNC: NEGATIVE MG/DL
QRS AXIS: 63 DEGREES
QRSD INTERVAL: 88 MS
QT INTERVAL: 424 MS
QTC INTERVAL: 426 MS
RBC # BLD AUTO: 5.19 MILLION/UL (ref 3.81–5.12)
SODIUM SERPL-SCNC: 134 MMOL/L (ref 135–147)
SP GR UR STRIP.AUTO: 1.02
T WAVE AXIS: 59 DEGREES
THC UR QL: NEGATIVE
UROBILINOGEN UR QL STRIP.AUTO: 0.2 E.U./DL
VENTRICULAR RATE: 61 BPM
WBC # BLD AUTO: 7.56 THOUSAND/UL (ref 4.31–10.16)

## 2023-08-31 PROCEDURE — 99284 EMERGENCY DEPT VISIT MOD MDM: CPT

## 2023-08-31 PROCEDURE — 85025 COMPLETE CBC W/AUTO DIFF WBC: CPT | Performed by: FAMILY MEDICINE

## 2023-08-31 PROCEDURE — 36415 COLL VENOUS BLD VENIPUNCTURE: CPT | Performed by: FAMILY MEDICINE

## 2023-08-31 PROCEDURE — 80307 DRUG TEST PRSMV CHEM ANLYZR: CPT | Performed by: FAMILY MEDICINE

## 2023-08-31 PROCEDURE — 93010 ELECTROCARDIOGRAM REPORT: CPT | Performed by: INTERNAL MEDICINE

## 2023-08-31 PROCEDURE — 81025 URINE PREGNANCY TEST: CPT | Performed by: EMERGENCY MEDICINE

## 2023-08-31 PROCEDURE — 80048 BASIC METABOLIC PNL TOTAL CA: CPT | Performed by: FAMILY MEDICINE

## 2023-08-31 PROCEDURE — 96360 HYDRATION IV INFUSION INIT: CPT

## 2023-08-31 PROCEDURE — 96361 HYDRATE IV INFUSION ADD-ON: CPT

## 2023-08-31 PROCEDURE — 81003 URINALYSIS AUTO W/O SCOPE: CPT | Performed by: FAMILY MEDICINE

## 2023-08-31 PROCEDURE — 93005 ELECTROCARDIOGRAM TRACING: CPT

## 2023-08-31 RX ADMIN — SODIUM CHLORIDE 1000 ML: 0.9 INJECTION, SOLUTION INTRAVENOUS at 17:57

## 2023-08-31 RX ADMIN — SODIUM CHLORIDE 1000 ML: 0.9 INJECTION, SOLUTION INTRAVENOUS at 15:01

## 2023-08-31 NOTE — ED CARE HANDOFF
Emergency Department Sign Out Note        Sign out and transfer of care from Dr. Anna Mojica. See Separate Emergency Department note. The patient, Thomas Shoemaker, was evaluated by the previous provider for somnolence and fatigue, with suspected drug use. Workup Completed:  CBC, BMP, drug screen, UA    ED Course / Workup Pending (followup): On examination:  The patient is awake, alert and oriented  HEENT: Normocephalic/atraumatic  External examination of the ears is unremarkable  Pupils are equal round and reactive to light, there is no conjunctival injection or scleral icterus noted  Nares are patent without rhinorrhea. The oropharynx is moist without injection  The neck is supple  Lungs: Equal chest rise  Heart: Good peripheral perfusion  Abdomen: Non distended  Musculoskeletal: Normal range of motion with grossly normal strength  Neuro: Nonfocal exam  Skin: No rash noted  Psych: Mood and affect normal        Patient reevaluated at the time of signout. She had improved her bradycardia and alertness levels, however still slightly fatigued. She felt that she was likely dehydrated. Given a second liter of fluid. Patient reassessed again at the 4-hour chin. She was with police that were accompanying her reported is back to her baseline. Patient was able to stand and walk without difficulty. She was completely alert and had no other medical complaints. She was discharged to the care of the police to be brought to alf. Patient stated understanding and agreement with the plan.                                    Procedures  MDM        Disposition  Final diagnoses:   Dehydration   Fatigue   Polysubstance abuse ([address])     Time reflects when diagnosis was documented in both MDM as applicable and the Disposition within this note     Time User Action Codes Description Comment    8/31/2023  6:10 PM Nixon Belle [E86.0] Dehydration     8/31/2023  6:10 PM Nixon Belle [R53.83] Fatigue     8/31/2023  6:11 PM Tim Villa Add [V65.87] Polysubstance abuse [facility])       ED Disposition     ED Disposition   Discharge    Condition   Stable    Date/Time   Thu Aug 31, 2023  6:42 PM    Comment   Rosanne Kelly discharge to home/self care. Follow-up Information     Follow up With Specialties Details Why Contact Info    senior care doctor    1-2 days        Discharge Medication List as of 8/31/2023  6:46 PM      CONTINUE these medications which have NOT CHANGED    Details   escitalopram (LEXAPRO) 5 mg tablet Take 1 tablet (5 mg total) by mouth daily for 30 days, Starting Wed 7/25/2018, Until Fri 8/24/2018, Print      traZODone (DESYREL) 50 mg tablet Take 1 tablet (50 mg total) by mouth daily at bedtime for 30 days, Starting Tue 7/24/2018, Until Thu 8/23/2018, Print           No discharge procedures on file.        ED Provider  Electronically Signed by     Arleth Jimenez MD  08/31/23 [id number]

## 2023-08-31 NOTE — ED PROVIDER NOTES
History  Chief Complaint   Patient presents with   • Lethargy     Patient was [facility]  from [place]. and is in custody. Patient was denied by [facility] due to patient being lethargic and slow to respond. Patient is withdrawing from fentanyl and last used yesterday prior to going to [employer]. Unknown what patient was being given for withdrawl     HPI  This is a 38-year-old female who was taken to [address] [place] [address] [facility] from [facility]. Patient was in [facility] for drug possession. Patient admitted to using methamphetamine fentanyl and Xanax. Apparently when she arrived at [facility] they recommended to take patient back to the hospital for medical clearance. Patient denies any recent drug use last use was day before. Denies any chest pain shortness of breath. She does still appears to be somnolent possibly still under the influence. Prior to Admission Medications   Prescriptions Last Dose Informant Patient Reported? Taking?   escitalopram (LEXAPRO) 5 mg tablet   No No   Sig: Take 1 tablet (5 mg total) by mouth daily for 30 days   traZODone (DESYREL) 50 mg tablet   No No   Sig: Take 1 tablet (50 mg total) by mouth daily at bedtime for 30 days      Facility-Administered Medications: None       Past Medical History:   Diagnosis Date   • Depression    • Suicide attempt [facility])        History reviewed. No pertinent surgical history. History reviewed. No pertinent family history. I have reviewed and agree with the history as documented. E-Cigarette/Vaping     E-Cigarette/Vaping Substances     Social History     Tobacco Use   • Smoking status: Never   • Smokeless tobacco: Never   Substance Use Topics   • Alcohol use: No   • Drug use: Yes     Types: Prescription, Marijuana, Fentanyl, Methamphetamines, Benzodiazepines     Comment: Percocet       Review of Systems   Constitutional: Negative for chills and fever. HENT: Negative for rhinorrhea and sore throat.     Eyes: Negative for visual disturbance. Respiratory: Negative for cough and shortness of breath. Cardiovascular: Negative for chest pain and leg swelling. Gastrointestinal: Negative for abdominal pain, diarrhea, nausea and vomiting. Genitourinary: Negative for dysuria. Musculoskeletal: Negative for back pain and myalgias. Skin: Negative for rash. Neurological: Negative for dizziness and headaches. Psychiatric/Behavioral: Negative for confusion. All other systems reviewed and are negative. Physical Exam  Physical Exam  Vitals and nursing note reviewed. Constitutional:       Appearance: She is well-developed. Comments: Easily arousable but does appear to be sleepy    HENT:      Head: Normocephalic and atraumatic. Right Ear: External ear normal.      Left Ear: External ear normal.      Nose: Nose normal.      Mouth/Throat:      Mouth: Mucous membranes are moist.      Pharynx: No oropharyngeal exudate. Eyes:      General: No scleral icterus. Right eye: No discharge. Left eye: No discharge. Conjunctiva/sclera: Conjunctivae normal.      Pupils: Pupils are equal, round, and reactive to light. Cardiovascular:      Rate and Rhythm: Normal rate and regular rhythm. Pulses: Normal pulses. Heart sounds: Normal heart sounds. Pulmonary:      Effort: Pulmonary effort is normal. No respiratory distress. Breath sounds: Normal breath sounds. No wheezing. Abdominal:      General: Bowel sounds are normal.      Palpations: Abdomen is soft. Musculoskeletal:         General: Normal range of motion. Cervical back: Normal range of motion and neck supple. Lymphadenopathy:      Cervical: No cervical adenopathy. Skin:     General: Skin is warm and dry. Capillary Refill: Capillary refill takes less than 2 seconds. Neurological:      General: No focal deficit present. Mental Status: She is alert, oriented to person, place, and time and easily aroused.    Psychiatric: Mood and Affect: Mood is not depressed. Vital Signs  ED Triage Vitals [08/31/23 1454]   Temperature Pulse Respirations Blood Pressure SpO2   (!) 97.4 °F (36.3 °C) 72 18 133/58 100 %      Temp Source Heart Rate Source Patient Position - Orthostatic VS BP Location FiO2 (%)   Tympanic Monitor Sitting Left arm --      Pain Score       --           Vitals:    08/31/23 1454 08/31/23 1700 08/31/23 1800   BP: 133/58 108/59 99/58   Pulse: 72 73 77   Patient Position - Orthostatic VS: Sitting           Visual Acuity      ED Medications  Medications   sodium chloride 0.9 % bolus 1,000 mL (1,000 mL Intravenous New Bag 8/31/23 1757)   sodium chloride 0.9 % bolus 1,000 mL (0 mL Intravenous Stopped 8/31/23 1747)       Diagnostic Studies  Results Reviewed     Procedure Component Value Units Date/Time    POCT pregnancy, urine [[id number]]  (Normal) Resulted: 08/31/23 1755    Lab Status: Final result Updated: 08/31/23 1755     EXT Preg Test, Ur Negative     Control Valid    Rapid drug screen, urine [[id number]]  (Abnormal) Collected: 08/31/23 1603    Lab Status: Final result Specimen: Urine, Clean Catch Updated: 08/31/23 1701     Amph/Meth UR Positive     Barbiturate Ur Negative     Benzodiazepine Urine Negative     Cocaine Urine Negative     Methadone Urine --     Opiate Urine Positive     PCP Ur Negative     THC Urine Negative     Oxycodone Urine Negative    Narrative:      Presumptive report. If requested, specimen will be sent to reference lab for confirmation. FOR MEDICAL PURPOSES ONLY. IF CONFIRMATION NEEDED PLEASE CONTACT THE LAB WITHIN 5 DAYS.     Drug Screen Cutoff Levels:  AMPHETAMINE/METHAMPHETAMINES  1000 ng/mL  BARBITURATES     200 ng/mL  BENZODIAZEPINES     200 ng/mL  COCAINE      300 ng/mL  METHADONE      300 ng/mL  OPIATES      300 ng/mL  PHENCYCLIDINE     25 ng/mL  THC       50 ng/mL  OXYCODONE      100 ng/mL    UA w Reflex to Microscopic w Reflex to Culture [[id number]]  (Abnormal) Collected: 08/31/23 1603 Lab Status: Final result Specimen: Urine, Clean Catch Updated: 08/31/23 1616     Color, UA Yellow     Clarity, UA Hazy     Specific Gravity, UA 1.020     pH, UA 6.5     Leukocytes, UA Negative     Nitrite, UA Negative     Protein, UA Negative mg/dl      Glucose, UA Negative mg/dl      Ketones, UA Trace mg/dl      Urobilinogen, UA 0.2 E.U./dl      Bilirubin, UA Negative     Occult Blood, UA Negative    Basic metabolic panel [[id number]]  (Abnormal) Collected: 08/31/23 1506    Lab Status: Final result Specimen: Blood from Arm, Left Updated: 08/31/23 1531     Sodium 134 mmol/L      Potassium 4.2 mmol/L      Chloride 103 mmol/L      CO2 22 mmol/L      ANION GAP 9 mmol/L      BUN 17 mg/dL      Creatinine 0.81 mg/dL      Glucose 102 mg/dL      Calcium 9.6 mg/dL      eGFR 101 ml/min/1.73sq m     Narrative:      National Kidney Disease Foundation guidelines for Chronic Kidney Disease (CKD):   •  Stage 1 with normal or high GFR (GFR > 90 mL/min/1.73 square meters)  •  Stage 2 Mild CKD (GFR = 60-89 mL/min/1.73 square meters)  •  Stage 3A Moderate CKD (GFR = 45-59 mL/min/1.73 square meters)  •  Stage 3B Moderate CKD (GFR = 30-44 mL/min/1.73 square meters)  •  Stage 4 Severe CKD (GFR = 15-29 mL/min/1.73 square meters)  •  Stage 5 End Stage CKD (GFR <15 mL/min/1.73 square meters)  Note: GFR calculation is accurate only with a steady state creatinine    CBC and differential [[id number]]  (Abnormal) Collected: 08/31/23 1506    Lab Status: Final result Specimen: Blood from Arm, Left Updated: 08/31/23 1513     WBC 7.56 Thousand/uL      RBC 5.19 Million/uL      Hemoglobin 13.2 g/dL      Hematocrit 40.8 %      MCV 79 fL      MCH 25.4 pg      MCHC 32.4 g/dL      RDW 14.2 %      MPV 10.0 fL      Platelets 944 Thousands/uL      nRBC 0 /100 WBCs      Neutrophils Relative 67 %      Immat GRANS % 0 %      Lymphocytes Relative 27 %      Monocytes Relative 6 %      Eosinophils Relative 0 %      Basophils Relative 0 %      Neutrophils Absolute 5.07 Thousands/µL      Immature Grans Absolute 0.01 Thousand/uL      Lymphocytes Absolute 2.02 Thousands/µL      Monocytes Absolute 0.44 Thousand/µL      Eosinophils Absolute 0.02 Thousand/µL      Basophils Absolute 0.00 Thousands/µL                  No orders to display              Procedures  Procedures         ED Course                               SBIRT 20yo+    Flowsheet Row Most Recent Value   Initial Alcohol Screen: US AUDIT-C     1. How often do you have a drink containing alcohol? 0 Filed at: 08/31/2023 1453   2. How many drinks containing alcohol do you have on a typical day you are drinking? 0 Filed at: 08/31/2023 1453   3a. Male UNDER 65: How often do you have five or more drinks on one occasion? 0 Filed at: 08/31/2023 1453   3b. FEMALE Any Age, or MALE 65+: How often do you have 4 or more drinks on one occassion? 0 Filed at: 08/31/2023 1453   Audit-C Score 0 Filed at: 08/31/2023 1453   VINNIE: How many times in the past year have you. .. Used an illegal drug or used a prescription medication for non-medical reasons? Never Filed at: 08/31/2023 1453                    Medical Decision Making  9year-old female was a transporting from [facility] to [address] [place] [address] was brought in by police for medical clearance. Patient with history of drug possession and use. Will obtain labs CBC BMP UA. Patient does appear to be sleepy but easily arousable answer questions appropriately. History taken from patient and the police who is at bedside. Labs reviewed within normal limits  Continue to observe patient for another 2 hours. Patient care transferred to Dr. Yomaira Pal. Amount and/or Complexity of Data Reviewed  Labs: ordered.           Disposition  Final diagnoses:   Dehydration   Fatigue   Polysubstance abuse ([address])     Time reflects when diagnosis was documented in both MDM as applicable and the Disposition within this note     Time User Action Codes Description Comment    8/31/2023  6:10 PM Archana Ma [E86.0] Dehydration     8/31/2023  6:10 PM Alexis Hu Add [R53.83] Fatigue     8/31/2023  6:11 PM Alexis Hu Add [F19.10] Polysubstance abuse [facility])       ED Disposition     None      Follow-up Information    None         Patient's Medications   Discharge Prescriptions    No medications on file       No discharge procedures on file.     PDMP Review     None          ED Provider  Electronically Signed by           Alexis Hu MD  08/31/23 2738